# Patient Record
Sex: FEMALE | Race: WHITE | NOT HISPANIC OR LATINO | Employment: FULL TIME | URBAN - METROPOLITAN AREA
[De-identification: names, ages, dates, MRNs, and addresses within clinical notes are randomized per-mention and may not be internally consistent; named-entity substitution may affect disease eponyms.]

---

## 2023-05-29 ENCOUNTER — PATIENT MESSAGE (OUTPATIENT)
Dept: PRIMARY CARE CLINIC | Facility: CLINIC | Age: 33
End: 2023-05-29
Payer: COMMERCIAL

## 2023-05-30 ENCOUNTER — OFFICE VISIT (OUTPATIENT)
Dept: PRIMARY CARE CLINIC | Facility: CLINIC | Age: 33
End: 2023-05-30
Payer: COMMERCIAL

## 2023-05-30 ENCOUNTER — LAB VISIT (OUTPATIENT)
Dept: LAB | Facility: HOSPITAL | Age: 33
End: 2023-05-30
Attending: FAMILY MEDICINE
Payer: COMMERCIAL

## 2023-05-30 VITALS
HEIGHT: 64 IN | HEART RATE: 87 BPM | RESPIRATION RATE: 18 BRPM | DIASTOLIC BLOOD PRESSURE: 72 MMHG | SYSTOLIC BLOOD PRESSURE: 112 MMHG | BODY MASS INDEX: 22.77 KG/M2 | OXYGEN SATURATION: 97 % | WEIGHT: 133.38 LBS

## 2023-05-30 DIAGNOSIS — Z86.32 HISTORY OF GESTATIONAL DIABETES: ICD-10-CM

## 2023-05-30 DIAGNOSIS — E03.9 ACQUIRED HYPOTHYROIDISM: ICD-10-CM

## 2023-05-30 DIAGNOSIS — Z00.00 ROUTINE GENERAL MEDICAL EXAMINATION AT A HEALTH CARE FACILITY: ICD-10-CM

## 2023-05-30 DIAGNOSIS — Z00.00 ROUTINE GENERAL MEDICAL EXAMINATION AT A HEALTH CARE FACILITY: Primary | ICD-10-CM

## 2023-05-30 PROBLEM — R11.0 NAUSEA: Status: ACTIVE | Noted: 2022-06-01

## 2023-05-30 LAB
ALBUMIN SERPL BCP-MCNC: 4.2 G/DL (ref 3.5–5.2)
ALP SERPL-CCNC: 51 U/L (ref 55–135)
ALT SERPL W/O P-5'-P-CCNC: 25 U/L (ref 10–44)
ANION GAP SERPL CALC-SCNC: 6 MMOL/L (ref 8–16)
AST SERPL-CCNC: 20 U/L (ref 10–40)
BASOPHILS # BLD AUTO: 0.04 K/UL (ref 0–0.2)
BASOPHILS NFR BLD: 0.7 % (ref 0–1.9)
BILIRUB SERPL-MCNC: 0.4 MG/DL (ref 0.1–1)
BUN SERPL-MCNC: 9 MG/DL (ref 6–20)
CALCIUM SERPL-MCNC: 9.4 MG/DL (ref 8.7–10.5)
CHLORIDE SERPL-SCNC: 107 MMOL/L (ref 95–110)
CHOLEST SERPL-MCNC: 124 MG/DL (ref 120–199)
CHOLEST/HDLC SERPL: 3.1 {RATIO} (ref 2–5)
CO2 SERPL-SCNC: 29 MMOL/L (ref 23–29)
CREAT SERPL-MCNC: 0.7 MG/DL (ref 0.5–1.4)
DIFFERENTIAL METHOD: ABNORMAL
EOSINOPHIL # BLD AUTO: 0.1 K/UL (ref 0–0.5)
EOSINOPHIL NFR BLD: 1.1 % (ref 0–8)
ERYTHROCYTE [DISTWIDTH] IN BLOOD BY AUTOMATED COUNT: 13.1 % (ref 11.5–14.5)
EST. GFR  (NO RACE VARIABLE): >60 ML/MIN/1.73 M^2
ESTIMATED AVG GLUCOSE: 94 MG/DL (ref 68–131)
GLUCOSE SERPL-MCNC: 85 MG/DL (ref 70–110)
HBA1C MFR BLD: 4.9 % (ref 4–5.6)
HCT VFR BLD AUTO: 40.6 % (ref 37–48.5)
HDLC SERPL-MCNC: 40 MG/DL (ref 40–75)
HDLC SERPL: 32.3 % (ref 20–50)
HGB BLD-MCNC: 12.7 G/DL (ref 12–16)
IMM GRANULOCYTES # BLD AUTO: 0.01 K/UL (ref 0–0.04)
IMM GRANULOCYTES NFR BLD AUTO: 0.2 % (ref 0–0.5)
LDLC SERPL CALC-MCNC: 72.8 MG/DL (ref 63–159)
LYMPHOCYTES # BLD AUTO: 1.2 K/UL (ref 1–4.8)
LYMPHOCYTES NFR BLD: 20.5 % (ref 18–48)
MCH RBC QN AUTO: 30.3 PG (ref 27–31)
MCHC RBC AUTO-ENTMCNC: 31.3 G/DL (ref 32–36)
MCV RBC AUTO: 97 FL (ref 82–98)
MONOCYTES # BLD AUTO: 0.4 K/UL (ref 0.3–1)
MONOCYTES NFR BLD: 6.8 % (ref 4–15)
NEUTROPHILS # BLD AUTO: 4 K/UL (ref 1.8–7.7)
NEUTROPHILS NFR BLD: 70.7 % (ref 38–73)
NONHDLC SERPL-MCNC: 84 MG/DL
NRBC BLD-RTO: 0 /100 WBC
PLATELET # BLD AUTO: 228 K/UL (ref 150–450)
PMV BLD AUTO: 11.5 FL (ref 9.2–12.9)
POTASSIUM SERPL-SCNC: 4.4 MMOL/L (ref 3.5–5.1)
PROT SERPL-MCNC: 7.2 G/DL (ref 6–8.4)
RBC # BLD AUTO: 4.19 M/UL (ref 4–5.4)
SODIUM SERPL-SCNC: 142 MMOL/L (ref 136–145)
TRIGL SERPL-MCNC: 56 MG/DL (ref 30–150)
WBC # BLD AUTO: 5.6 K/UL (ref 3.9–12.7)

## 2023-05-30 PROCEDURE — 36415 COLL VENOUS BLD VENIPUNCTURE: CPT | Mod: PN | Performed by: FAMILY MEDICINE

## 2023-05-30 PROCEDURE — 3074F PR MOST RECENT SYSTOLIC BLOOD PRESSURE < 130 MM HG: ICD-10-PCS | Mod: CPTII,S$GLB,, | Performed by: FAMILY MEDICINE

## 2023-05-30 PROCEDURE — 1159F PR MEDICATION LIST DOCUMENTED IN MEDICAL RECORD: ICD-10-PCS | Mod: CPTII,S$GLB,, | Performed by: FAMILY MEDICINE

## 2023-05-30 PROCEDURE — 1159F MED LIST DOCD IN RCRD: CPT | Mod: CPTII,S$GLB,, | Performed by: FAMILY MEDICINE

## 2023-05-30 PROCEDURE — 3008F BODY MASS INDEX DOCD: CPT | Mod: CPTII,S$GLB,, | Performed by: FAMILY MEDICINE

## 2023-05-30 PROCEDURE — 84443 ASSAY THYROID STIM HORMONE: CPT | Performed by: FAMILY MEDICINE

## 2023-05-30 PROCEDURE — 3008F PR BODY MASS INDEX (BMI) DOCUMENTED: ICD-10-PCS | Mod: CPTII,S$GLB,, | Performed by: FAMILY MEDICINE

## 2023-05-30 PROCEDURE — 85025 COMPLETE CBC W/AUTO DIFF WBC: CPT | Performed by: FAMILY MEDICINE

## 2023-05-30 PROCEDURE — 1160F RVW MEDS BY RX/DR IN RCRD: CPT | Mod: CPTII,S$GLB,, | Performed by: FAMILY MEDICINE

## 2023-05-30 PROCEDURE — 3078F PR MOST RECENT DIASTOLIC BLOOD PRESSURE < 80 MM HG: ICD-10-PCS | Mod: CPTII,S$GLB,, | Performed by: FAMILY MEDICINE

## 2023-05-30 PROCEDURE — 3074F SYST BP LT 130 MM HG: CPT | Mod: CPTII,S$GLB,, | Performed by: FAMILY MEDICINE

## 2023-05-30 PROCEDURE — 99999 PR PBB SHADOW E&M-EST. PATIENT-LVL IV: CPT | Mod: PBBFAC,,, | Performed by: FAMILY MEDICINE

## 2023-05-30 PROCEDURE — 3078F DIAST BP <80 MM HG: CPT | Mod: CPTII,S$GLB,, | Performed by: FAMILY MEDICINE

## 2023-05-30 PROCEDURE — 99999 PR PBB SHADOW E&M-EST. PATIENT-LVL IV: ICD-10-PCS | Mod: PBBFAC,,, | Performed by: FAMILY MEDICINE

## 2023-05-30 PROCEDURE — 80053 COMPREHEN METABOLIC PANEL: CPT | Performed by: FAMILY MEDICINE

## 2023-05-30 PROCEDURE — 80061 LIPID PANEL: CPT | Performed by: FAMILY MEDICINE

## 2023-05-30 PROCEDURE — 83525 ASSAY OF INSULIN: CPT | Performed by: FAMILY MEDICINE

## 2023-05-30 PROCEDURE — 99385 PR PREVENTIVE VISIT,NEW,18-39: ICD-10-PCS | Mod: S$GLB,,, | Performed by: FAMILY MEDICINE

## 2023-05-30 PROCEDURE — 1160F PR REVIEW ALL MEDS BY PRESCRIBER/CLIN PHARMACIST DOCUMENTED: ICD-10-PCS | Mod: CPTII,S$GLB,, | Performed by: FAMILY MEDICINE

## 2023-05-30 PROCEDURE — 84439 ASSAY OF FREE THYROXINE: CPT | Performed by: FAMILY MEDICINE

## 2023-05-30 PROCEDURE — 99385 PREV VISIT NEW AGE 18-39: CPT | Mod: S$GLB,,, | Performed by: FAMILY MEDICINE

## 2023-05-30 PROCEDURE — 83036 HEMOGLOBIN GLYCOSYLATED A1C: CPT | Performed by: FAMILY MEDICINE

## 2023-05-30 RX ORDER — SERTRALINE HYDROCHLORIDE 50 MG/1
TABLET, FILM COATED ORAL
COMMUNITY
End: 2023-05-30 | Stop reason: ALTCHOICE

## 2023-05-30 RX ORDER — LEVOTHYROXINE SODIUM 50 UG/1
50 TABLET ORAL EVERY MORNING
Qty: 90 TABLET | Refills: 3 | Status: SHIPPED | OUTPATIENT
Start: 2023-05-30

## 2023-05-30 RX ORDER — METFORMIN HYDROCHLORIDE 500 MG/1
TABLET, EXTENDED RELEASE ORAL
Qty: 166 TABLET | Refills: 0 | Status: SHIPPED | OUTPATIENT
Start: 2023-05-30 | End: 2023-08-23 | Stop reason: SDUPTHER

## 2023-05-30 NOTE — PATIENT INSTRUCTIONS
The Insulin-Resistance Diet--Revised and Updated: How to Turn Off Your Body's Fat-Making Machine Paperback - December 10, 2007 -2020 updated  by Cheryle R. Hart (Author), Renea Oliveira (Author)

## 2023-05-30 NOTE — PROGRESS NOTES
Subjective:      Patient ID: Betty Westbrook is a 33 y.o. female.    Chief Complaint: Annual Exam and Establish Care      Patient is a 33 y.o. female coming in today to establish care. This is a new pt to me.   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 Reports she recently found out thyroid issues after recent lab work. Denies tobacco or drug use. She drinks alcohol twice a week. Currently sexually active with ; they have 2 kids. Pt has IUD in place. She had both births via . She is concerned for her weight gain for the past few years. States she has gained around 15-20 lbs in the last 3 years. Hx of gestational diabetes. Has not tried Metformin for blood glucose management in the past.     No questionnaires on file.  Past Medical History:   Diagnosis Date    Gestational diabetes     Hypothyroidism     Insulin resistance      Past Surgical History:   Procedure Laterality Date     SECTION, LOW TRANSVERSE  2019    x 2 and also in     WISDOM TOOTH EXTRACTION  2019     History reviewed. No pertinent family history.  Social History     Tobacco Use    Smoking status: Never    Smokeless tobacco: Never   Substance and Sexual Activity    Alcohol use: Yes     Alcohol/week: 2.0 standard drinks     Types: 2 Standard drinks or equivalent per week    Drug use: Never    Sexual activity: Yes     Partners: Male     Birth control/protection: I.U.D.       LABS:   No results found for: HGBA1C   No results found for: CHOL  No results found for: LDLCALC  No results found for: WBC, HGB, HCT, PLT, CHOL, TRIG, HDL, LDLDIRECT, ALT, AST, NA, K, CL, CREATININE, BUN, CO2, TSH, PSA, INR, GLUF, HGBA1C, MICROALBUR    No image results found.        Review of Systems   Constitutional:  Negative for chills, fatigue and fever.   HENT:  Negative for ear pain and trouble swallowing.    Eyes:  Negative for pain and visual disturbance.   Respiratory:  Negative for cough and shortness of breath.    Cardiovascular:   "Negative for chest pain and leg swelling.   Gastrointestinal:  Negative for abdominal pain, blood in stool, nausea and vomiting.   Endocrine: Negative for cold intolerance and heat intolerance.   Genitourinary:  Negative for dysuria and frequency.   Musculoskeletal:  Negative for joint swelling, myalgias and neck pain.   Skin:  Negative for color change and rash.   Neurological:  Negative for dizziness and headaches.   Psychiatric/Behavioral:  Negative for behavioral problems and sleep disturbance.    Objective:     Vitals:    05/30/23 0808   BP: 112/72   BP Location: Left arm   Patient Position: Sitting   BP Method: Medium (Manual)   Pulse: 87   Resp: 18   SpO2: 97%   Weight: 60.5 kg (133 lb 6.1 oz)   Height: 5' 4" (1.626 m)     Wt Readings from Last 10 Encounters:   05/30/23 60.5 kg (133 lb 6.1 oz)   02/08/23 55.3 kg (122 lb)     Physical Exam  Vitals reviewed.   Constitutional:       Appearance: Normal appearance. She is well-developed and normal weight.   HENT:      Head: Normocephalic and atraumatic.      Right Ear: Tympanic membrane and external ear normal.      Left Ear: Tympanic membrane and external ear normal.      Nose: Nose normal.      Mouth/Throat:      Mouth: Mucous membranes are moist.      Pharynx: Oropharynx is clear.   Eyes:      Conjunctiva/sclera: Conjunctivae normal.      Pupils: Pupils are equal, round, and reactive to light.   Neck:      Thyroid: No thyromegaly.   Cardiovascular:      Rate and Rhythm: Normal rate and regular rhythm.      Heart sounds: Normal heart sounds. No murmur heard.    No friction rub. No gallop.   Pulmonary:      Effort: Pulmonary effort is normal. No respiratory distress.      Breath sounds: Normal breath sounds. No wheezing or rales.   Abdominal:      General: Bowel sounds are normal. There is no distension.      Palpations: Abdomen is soft.      Tenderness: There is no abdominal tenderness. There is no rebound.   Musculoskeletal:         General: Normal range of " motion.      Cervical back: Normal range of motion and neck supple.   Lymphadenopathy:      Cervical: No cervical adenopathy.   Skin:     General: Skin is warm and dry.      Findings: No rash.   Neurological:      Mental Status: She is alert and oriented to person, place, and time.   Psychiatric:         Attention and Perception: Attention and perception normal.         Mood and Affect: Mood and affect normal.         Speech: Speech normal.         Behavior: Behavior normal.         Thought Content: Thought content normal.         Cognition and Memory: Cognition and memory normal.         Judgment: Judgment normal.     Assessment:     1. Routine general medical examination at a health care facility    2. Acquired hypothyroidism    3. History of gestational diabetes      Plan:   Betty was seen today for annual exam and establish care.    Diagnoses and all orders for this visit:    Routine general medical examination at a health care facility  -     TSH; Future  -     T4, Free; Future  -     Lipid Panel; Future  -     Hemoglobin A1C; Future  -     Comprehensive Metabolic Panel; Future  -     CBC Auto Differential; Future  -     Insulin, Random; Future    Acquired hypothyroidism  -     TSH; Future  -     T4, Free; Future  -     Lipid Panel; Future  -     Hemoglobin A1C; Future  -     Comprehensive Metabolic Panel; Future  -     CBC Auto Differential; Future  -     Insulin, Random; Future  -     Ambulatory referral/consult to [facility]; Future    History of gestational diabetes  -     TSH; Future  -     T4, Free; Future  -     Lipid Panel; Future  -     Hemoglobin A1C; Future  -     Comprehensive Metabolic Panel; Future  -     CBC Auto Differential; Future  -     Insulin, Random; Future  -     metFORMIN (GLUCOPHAGE-XR) 500 MG ER 24hr tablet; Take 1 tablet (500 mg total) by mouth once daily for 14 days, THEN 1 tablet (500 mg total) 2 (two) times daily with meals. For prediabetes/sugar.  -     Ambulatory  referral/consult to [facility]; Future    Other orders  -     levothyroxine (SYNTHROID) 50 MCG tablet; Take 1 tablet by mouth every morning.      Pt established care with me today.   Lab work ordered to be completed today.   New Rx Metformin 500 mg/day for 2 weeks for blood glucose management; pt to increase Metformin to 500 mg BID after 2 weeks.  Refilled Rx Levothyroxine 50 mcg/day for thyroid disorder treatment.   [facility], hx of gestational diabetes. Prior on ozempic through [place] study and did well.    Advised on dietary changes and regular exercise.   Instructed to f/u in 1 year.     Health Maintenance Due   Topic Date Due    Hepatitis C Screening  Never done    Cervical Cancer Screening  Never done    HIV Screening  Never done    COVID-19 Vaccine (4 - Pfizer series) 12/15/2021     The ASCVD Risk score (Darell DK, et al., 2019) failed to calculate for the following reasons:    The 2019 ASCVD risk score is only valid for ages 40 to 79  Follow up in about 1 year (around 5/30/2024) for physical with Dr MÉNDEZ.    Patient Instructions   The Insulin-Resistance Diet--Revised and Updated: How to Turn Off Your Body's Fat-Making Machine Paperback - December 10, 2007 -2020 updated  by Cheryle R. Hart (Author), Renea Oliveira (Author)      Scribe Attestation:   I, Reymundo Mccain, am scribing for, and in the presence of, Dr. Mari Méndez MD. I performed the above scribed service and the documentation accurately describes the services I performed. I attest to the accuracy of the note.    I, Dr. Mari Méndez MD, reviewed documentation as scribed above. I personally performed the services described in this documentation.  I agree that the record reflects my personal performance and is accurate and complete. Mari Méndez MD.    05/30/2023

## 2023-05-31 LAB
INSULIN COLLECTION INTERVAL: NORMAL
INSULIN SERPL-ACNC: 5.7 UU/ML
T4 FREE SERPL-MCNC: 0.95 NG/DL (ref 0.71–1.51)
TSH SERPL DL<=0.005 MIU/L-ACNC: 1.06 UIU/ML (ref 0.4–4)

## 2023-06-01 ENCOUNTER — OFFICE VISIT (OUTPATIENT)
Dept: PRIMARY CARE CLINIC | Facility: CLINIC | Age: 33
End: 2023-06-01
Payer: COMMERCIAL

## 2023-06-01 VITALS
TEMPERATURE: 99 F | BODY MASS INDEX: 22.74 KG/M2 | WEIGHT: 133.19 LBS | OXYGEN SATURATION: 99 % | HEIGHT: 64 IN | DIASTOLIC BLOOD PRESSURE: 70 MMHG | SYSTOLIC BLOOD PRESSURE: 110 MMHG | HEART RATE: 78 BPM

## 2023-06-01 DIAGNOSIS — E88.819 INSULIN RESISTANCE: Primary | ICD-10-CM

## 2023-06-01 DIAGNOSIS — Z83.3 FAMILY HISTORY OF DIABETES MELLITUS (DM): ICD-10-CM

## 2023-06-01 DIAGNOSIS — E03.9 ACQUIRED HYPOTHYROIDISM: ICD-10-CM

## 2023-06-01 DIAGNOSIS — Z86.32 HISTORY OF GESTATIONAL DIABETES: ICD-10-CM

## 2023-06-01 DIAGNOSIS — R63.5 WEIGHT GAIN: ICD-10-CM

## 2023-06-01 PROCEDURE — 99999 PR PBB SHADOW E&M-EST. PATIENT-LVL V: ICD-10-PCS | Mod: PBBFAC,,, | Performed by: FAMILY MEDICINE

## 2023-06-01 PROCEDURE — 1160F RVW MEDS BY RX/DR IN RCRD: CPT | Mod: CPTII,S$GLB,, | Performed by: FAMILY MEDICINE

## 2023-06-01 PROCEDURE — 99214 PR OFFICE/OUTPT VISIT, EST, LEVL IV, 30-39 MIN: ICD-10-PCS | Mod: S$GLB,,, | Performed by: FAMILY MEDICINE

## 2023-06-01 PROCEDURE — 3074F PR MOST RECENT SYSTOLIC BLOOD PRESSURE < 130 MM HG: ICD-10-PCS | Mod: CPTII,S$GLB,, | Performed by: FAMILY MEDICINE

## 2023-06-01 PROCEDURE — 99999 PR PBB SHADOW E&M-EST. PATIENT-LVL V: CPT | Mod: PBBFAC,,, | Performed by: FAMILY MEDICINE

## 2023-06-01 PROCEDURE — 1159F MED LIST DOCD IN RCRD: CPT | Mod: CPTII,S$GLB,, | Performed by: FAMILY MEDICINE

## 2023-06-01 PROCEDURE — 3078F PR MOST RECENT DIASTOLIC BLOOD PRESSURE < 80 MM HG: ICD-10-PCS | Mod: CPTII,S$GLB,, | Performed by: FAMILY MEDICINE

## 2023-06-01 PROCEDURE — 3008F BODY MASS INDEX DOCD: CPT | Mod: CPTII,S$GLB,, | Performed by: FAMILY MEDICINE

## 2023-06-01 PROCEDURE — 3078F DIAST BP <80 MM HG: CPT | Mod: CPTII,S$GLB,, | Performed by: FAMILY MEDICINE

## 2023-06-01 PROCEDURE — 3044F PR MOST RECENT HEMOGLOBIN A1C LEVEL <7.0%: ICD-10-PCS | Mod: CPTII,S$GLB,, | Performed by: FAMILY MEDICINE

## 2023-06-01 PROCEDURE — 1159F PR MEDICATION LIST DOCUMENTED IN MEDICAL RECORD: ICD-10-PCS | Mod: CPTII,S$GLB,, | Performed by: FAMILY MEDICINE

## 2023-06-01 PROCEDURE — 3074F SYST BP LT 130 MM HG: CPT | Mod: CPTII,S$GLB,, | Performed by: FAMILY MEDICINE

## 2023-06-01 PROCEDURE — 3044F HG A1C LEVEL LT 7.0%: CPT | Mod: CPTII,S$GLB,, | Performed by: FAMILY MEDICINE

## 2023-06-01 PROCEDURE — 3008F PR BODY MASS INDEX (BMI) DOCUMENTED: ICD-10-PCS | Mod: CPTII,S$GLB,, | Performed by: FAMILY MEDICINE

## 2023-06-01 PROCEDURE — 1160F PR REVIEW ALL MEDS BY PRESCRIBER/CLIN PHARMACIST DOCUMENTED: ICD-10-PCS | Mod: CPTII,S$GLB,, | Performed by: FAMILY MEDICINE

## 2023-06-01 PROCEDURE — 99214 OFFICE O/P EST MOD 30 MIN: CPT | Mod: S$GLB,,, | Performed by: FAMILY MEDICINE

## 2023-06-01 NOTE — PROGRESS NOTES
Betty,     Your lab results are within recommended goals for your age and conditions. No change in therapy is needed. Please let me know if you have further questions.    Sincerely,   Mari Méndez MD

## 2023-06-01 NOTE — PROGRESS NOTES
Subjective     Pmhx, fam hx, soc hx, surg hx, allergies, med list reviewed  Referring MD: Honey  PCP: same  BMI noted 22  Diet: variable  Exercise/Activity: as below  Sleep: fair  Stressors: work/peds OT  Anxiety/Depression Screen/PHQ-2: stable     Wt Readings from Last 3 Encounters:   06/01/23 1005 60.4 kg (133 lb 2.5 oz)   05/30/23 0808 60.5 kg (133 lb 6.1 oz)   02/08/23 0751 55.3 kg (122 lb)     Has IUD; has had weight gain.       Patient ID: Betty Westbrook is a 33 y.o. female.    Chief Complaint: weight gain     Strong fam hx of DM II. Had gestational DM II. Weight went from 163 to 115 while on study--was on for 8  mos. Study completed and was off the medication. She is working out 3-4 times/week and has seen weight increase. More cravings. She felt like was doing very well. ([facility] study).     Feels like max lowest was in 150s. Prior to children was in 130s.     Thus far tolerating metformin. DW pt Pt had IR on panel at [facility].     Hx of BMR done in the past; does not know what it was.       Food recall:  Bfast: coffee with cream/sugar  Eats 11:30  Add protein to coffee (collagen)  Lunch: turkey pesto/spinach turkey wrap Taco salad from downstairs  Spinach red onion, cranberries, rotisserie chicken  Iced coffee at times  Dinner:  cooks/works from home  Red beans/sausage, tacos, CFA once/week, 2 year old/4 year old  Snacks:   Water: adequate  Sugar Sweetened beverages: coffee    HPI  Review of Systems   Constitutional:  Negative for activity change, appetite change, fatigue and fever.   HENT:  Negative for mouth dryness and goiter.    Eyes:  Negative for visual disturbance.   Respiratory:  Negative for apnea, cough, chest tightness and shortness of breath.    Cardiovascular:  Negative for chest pain, palpitations and leg swelling.   Gastrointestinal:  Negative for abdominal pain, constipation and diarrhea.   Endocrine: Negative for cold intolerance, heat intolerance, polydipsia, polyphagia and polyuria.    Genitourinary:  Negative for frequency and menstrual problem.   Musculoskeletal:  Negative for arthralgias and myalgias.   Integumentary:  Negative for color change and rash.   Psychiatric/Behavioral:  Negative for sleep disturbance. The patient is not nervous/anxious.         Objective     Physical Exam  Vitals and nursing note reviewed.   Constitutional:       General: She is not in acute distress.  HENT:      Head: Normocephalic and atraumatic.      Mouth/Throat:      Pharynx: Oropharynx is clear.   Eyes:      General: No scleral icterus.  Neck:      Comments: No TM  Cardiovascular:      Rate and Rhythm: Normal rate and regular rhythm.      Pulses: Normal pulses.      Heart sounds: Normal heart sounds. No murmur heard.    No friction rub. No gallop.   Pulmonary:      Effort: Pulmonary effort is normal. No respiratory distress.      Breath sounds: Normal breath sounds. No wheezing, rhonchi or rales.   Abdominal:      General: Bowel sounds are normal. There is no distension.      Palpations: Abdomen is soft.      Tenderness: There is no abdominal tenderness.   Musculoskeletal:         General: No swelling.      Cervical back: Normal range of motion and neck supple. No tenderness.   Lymphadenopathy:      Cervical: No cervical adenopathy.   Skin:     General: Skin is warm.      Findings: No erythema or rash.   Neurological:      Mental Status: She is alert and oriented to person, place, and time.   Psychiatric:         Mood and Affect: Mood normal.         Behavior: Behavior normal.        Assessment and Plan       ICD-10-CM ICD-9-CM   1. Insulin resistance  E88.81 277.7   2. Weight gain  R63.5 783.1   3. History of gestational diabetes  Z86.32 V12.21   4. Acquired hypothyroidism  E03.9 244.9   5. BMI 22.0-22.9, adult  Z68.22 V85.1   6. Family history of diabetes mellitus (DM)  Z83.3 V18.0      Insulin resistance  Comments:  continue metformin as toleratd  dw pt consideration of glumetza  Did well on glp-1 in the  past but not an option now      Weight gain    History of gestational diabetes  Comments:  reviewed with pt her IR panel ([facility])  some hypoglycemia/consider CGM briefly  Orders:  -     Ambulatory referral/consult to [facility]    Acquired hypothyroidism  Comments:  chronic/stable  continue medication  Orders:  -     Ambulatory referral/consult to [facility]    BMI 22.0-22.9, adult    Family history of diabetes mellitus (DM)      35+ min with pt;      Did well on medication in the past.   20 min 2x/week resistance training (ok to start with this)  Stay on metformin   Specific ideas for protein pairing for snacks dw pt   Eat fit shopping list  Referral dietician when actively seeing patients)  Placed in system

## 2023-06-01 NOTE — PATIENT INSTRUCTIONS
"Nutrional Yeast ([place]): Red Star (baking aisle)  Low carb wraps with protein    Dark chocolate: Lilys chocolate, cecil Zero  Popcorn: Lesser Evil   Chips: Beanitos    Greek yogurt for dips    WM: Polo Hurley bread for pizza            PRODUCE  [] All fresh fruit   [] All fresh vegetables   [] All fresh herbs  [] All herb purees + pastes  [] Pre-spiralized vegetable noodles   [] Steam-In-The-Bag begetables  [] Riced cauliflower  [] Jicama sticks  [] Love Beets  all varieties  [] Wholly Guacamole  all varieties  [] Hummus  all varieties, chickpea + vegetable  [] Tofu Shirataki noodles    [] Tofu  all varieties  [] Tempeh  all varieties    PROTEIN  CHICKEN   [] Boneless, skinless breasts  [] Boneless, skinless thighs  [] Ground chicken breast, at least 93% lean  [] Chicken breast cutlet  [] Aidell's  Chicken Sausage + Chicken Meatballs    TURKEY   [] Turkey breast tenderloin   [] Ground turkey breast, at least 93% lean  [] [place] Naturals  Turkey Sausage    BEEF  [] Tenderloin  [] Sirloin  [] Top Loin  [] Flank Steak  [] Round Steak  [] Filet  [] Lean ground beef, at least 93% lean + grass-fed preferable    PORK  [] Tenderloin  [] Pork Chop  [] Center Cut  [] Jason Naturals  No-Sugar Rust    BISON  [] [place]  90 - 95% lean    SEAFOOD  [] All fresh fish + seafood; locally sourced when possible  [] Smoked salmon    HEAT + EAT ENTREES   [] Bala's Natural Foods  Chicken, Pork, Beef  [] Wenceslao  "All Natural" Grilled Chicken Breast + Strips, all varieties    SAUCES SPREADS + DIPS  [] Bitchin Sauce  Original, Chipotle, Cilantro [place]  [] Radha's Kitchen  Tzatziki Yogurt Dip, Babaganoush, Hummus  [] Wholly Guacamole  all varieties  [] Hummus  all varieties  [] [place] Gringo Salsa  all varieties  [] Mrs. Hilario's Salsa  all varieties  [] Stubb's All Natural BBQ Sauce  [] Primal Kitchen  Anabella Vasquez, BBQ Sauce  [] Primal Kitchen Pasta Sauce  Roasted Garlic, Tomato Basil, no-dairy Vodka " Sauce  [] Regis & Bella's  HeartSmart Pasta Sauce    DAIRY/DAIRY SUBSTITUTES/EGGS  EGGS   [] All eggs  cage-free, pasture-raise preferable  [] Crepini  egg wraps  [] Vital Farms  Pasture-Raised Egg Bites  [] JUST Egg [vegan]     CREAMERS   [] Califia  Better Half, original + vanilla unsweetened  [] NutPods  all varieties    MILK   [] Horizon Organic  all varieties except chocolate  [] Organic Valley  all varieties except chocolate  [] Organic Valley  ultra-filtered, reduced fat milk     PLANT_BASED MILK ALTERNATIVES  [] All unsweetened almond milks  original, vanilla + chocolate  [] Ripple  unsweetened   [] Milkadamia  original +_ vanilla, unsweetened   [] Forager  original + vanilla, unsweetened   [] Silk Organic  soy milk, unsweetened  [] Oatly  unsweetened  [] Califia  regular + protein-fortified oat milk, unsweetened     CHEESES  [] Regular or reduced fat cheeses  [] BelGioso  Fresh Mozzarella Snack Packs, Parmesan Power-full Snack   [] Goat cheese  [] Fresh mozzarella  [] String cheese  all varieties  [] Harika Cottage Cheese  [] Caitlin's Cultured Cottage Cheese  [] Cat Life 'Just Like Mozzarella'  plant-based shreds and other varieties  [] Parmela Creamery  plant-based shredded cheese    YOGURT  [] Fage  2% low-fat, plain  [] Siggi's  plain, vanilla  [] Chobani Greek  nonfat + whole milk yogurt, plain   [] Chobani Less Sugar  all flavored varieties   [] Oikos Greek  nonfat, plain  [] Two Good  all varieties   [] Kinyarwanda Provisions  plain  [] [place] Organic  low-fat + nonfat, plain  [] [facility]  goat milk yogurt, plain  [] Kefit  unsweetened, plain  [] Forager  Greek style unsweetened, plain [dairy-free]  [] [place]  unsweetened Greek style, plain [dairy-free]  [] [facility]  almond milk yogurt, vanilla or plain, unsweetened [dairy-free]    FREEZER SECTION  FROZEN VEGGIES  [] All plain frozen veggies + greens [e.g. broccoli, brussels, carrots, okra, mushrooms, zucchini,  yellow squash, butternut squash, kale, spinach, atif greens]  [] Riced veggies [e.g. cauliflower, broccoli, butternut squash]  [] Edamame  all varieties  [] Green Giant  [] Veggie Spirals  [] Marinated Veggies [e.g. eggplant, peppers, zucchini]  [] Simply Steam [place] Sprouts  [] Birds Eye  [] Power Blend Italian Style  lentils, broccoli, kale, zucchini  []  [place] Sprouts & Carrots  [] Oven Roasters Broccoli & Cauliflower  [] [place] Blend  [] Tattooed   [] Green Bean Blend  [] Farmer's Market Ratatouille  [] Butter Balsamic Glazed Vegetables  [] Riced Cauliflower & Quinoa Mediterranean Style  [] Sofia's Good Life  Southern Style Greens [sauteed kale + onion]    FROZEN FRUITS  [] All unsweetened frozen fruits  all varieties  [] Dole Fruits & Veggie Blends  Berries 'n Kale  [] Dole Mix-ins  Triple Berry     FROZEN ENTREES  [] The Good Kitchen meals  all varieties [ e.g. Chili Lime Chicken Over Riced Cauliflower]  [] Premium Paleo  Not Sergio Momma's Meatloaf  [] Primal Kitchen  Chicken Pesto + Steak Fajitas w/ Peppers & Onions  [] Eating Well Frozen Entrees  Butter Chicken Masala, Steak Carne Asada, Creamy Pesto Chicken, Chicken + Wild Rice Stroganoff, Yellow Chi Chicken, Sun-dried Tomato Chicken, Chicken Lo Mein  [] Realgood Entree Bowls  Barbadian Inspired Beef Bowl over Riced Cauliflower, Chicken Burrito Bowl   [] Great Karma Coconut Chi  [] Patrizia's  [place] Kathe with Black Beans, Vegetable Lasagna  [] Kashi Mayan [place] Bake  [] Healthy Choice  Simply Steamers Chicken Fried Rice  [] Basil Pesto Chicken & Alberto Style Pork Power Bowls  [] Tattooed   Enchilada Bowl  [] Aliya Farms  Spicy Black Bean Burgers    FROZEN PIZZAS  [] Cauli'flour Foods  Cauliflower Pizza Crusts  [] Outer Aisle  Cauliflower Crust  [] Patrizia's  Veggie Crust Cheese Pizza  [] Quest Pizza     VEGETARIAN PRODUCTS  [] Beyond Meat  ground 'meat' + grilled 'chicken' strips  [] Tofurkey  Original  Italian Sausage + Original Tempeh  [] Gardein  Beefless Ground + Meatless Meatballs  [] Aliya Farms Grillers  Original Burger, Crumbles, Meatballs    ICE CREAMS + FROZEN DESSERTS  [] Halo Top  regular + keto series, pops  [] Rebel  ice cream + dessert sandwiches  [] Enlightened  ice cream + bars  [] Nightfood  ice cream  [] Realgood  ice cream  [] Arctic Zero Fit  frozen pint  [] The Frozen Farmer  sorbets  [] Wholly Rollies  Protein Balls, all varieties  [] Dream Pops  Coconut Latte    FROZEN BREAKFASTS  [] Realgood  Breakfast Sandwiches on Cauliflower Cheesy Bread  [] Rebel  ice cream + dessert sandwiches  [] Enlightened  ice cream + bars  [] Nightfood  ice cream  [] Realgood  ice cream  [] Arctic Zero Fit  frozen pint  [] The Frozen Farmer  sorbets  [] Wholly Rollies  Protein Balls, all varieties  [] Dream Pops  Coconut Latte    BREADS/BUNS/WRAPS  [] Emiliano Bread: All Types - In Freezer Section   [] Flat Out Light Wraps - All Varieties   [] Flat Out Protein Up Carb Down Flat Bread   [] Kontos Whole Wheat Pocket Mei   [] Aamir RAMON 100% Whole Wheat Tortillas   [] LaTortilla Factory Tortillas - Smart & Delicious; 50 or 80-calorie   [] Nature's Own 100% Whole Wheat Bread   [] Orowheat Healthful - 100% Whole Wheat Slice Bread and Noble Thins   [] Orowheat Healthful - Whole Wheat Nuts & Grain Bread; Flax & Seed Bread   [] Pepperidge Farm Natural Whole Grain 15 Bread   [] Pepperidge Farm Natural Whole Grain English Muffin - 100% Whole Wheat   [] Pepperidge Farm Very Thin 100% Whole Wheat   [] Valentine Alejandro 45 Calories and Delightful   [] Jasiel' 100% Whole Wheat Thin-Sliced Bagels and English Muffins   [] Western Bagel: Perfect 10     GLUTEN FREE  [] Vaibhav's Gluten Free Bread   [] [place] Bakehouse 7-Grain Gluten Free Bread     LEGUME PASTA   [] Explore Asian Organic Black Bean Spaghetti   [] Modern Table   [] Tolerant Foods       NUT BUTTERS & JELLIES    NUT BUTTERS   [] Better'n Chocolate:  Coconut Chocolate Peanut Butter Spread   [] Better'n Peanut Butter - All Types   [] Earth Balance Coconut and Peanut Spread   [] Miles's Nut [place]   [] MaraNatha: All Natural Roasted Cashew Butter - Velpen or Creamy   [] MaraNatha: Roasted Peanut Butter   [] Nuts 'N More Peanut [place] - All Flavors   [] PB2 Powder - Original or Chocolate   [] Skippy Natural - Creamy, Super Chunk   [] Smart Balance Peanut Butter - Velpen or Creamy   [] Peanut Butter & Company:   [] Smooth , Crunch Time, The Heat Is On, Old Fashioned Smooth, Mighty Nut- Powdered Peanut Butter, Squeeze Pack   [] Smucker's Natural Peanut Butter - Velpen or Creamy   [] Sunbutter Nut Butter   [] Wild Friends Protein Peanut Butter/[place] o Butter - Vanilla or Chocolate     JELLIES  o Polaner's All Fruit   o Clearly Organic Best Choice: Strawberry Fruit Spread       SNACKS    BARS  [] Kashi Bars - Chewy or Crunchy; Honey [place] o Flax or Peanut Butter   [] KIND Bars - 5 Grams of Sugar or Less   [] KIND Protein Bars - Strong and KIND   [] Nature Valley Protein Bar - All Varieties   [] Nature Valley Roasted Nut Crunch - [place] Crunch; Peanut Crunch   [] [facility] Simple Nut Bar - Roasted Peanut & Honey   [] [facility] Simple Nut Bar - [place], Cashew & Sea Salt   [] [facility] Nut [place] Bar - Salted Caramel Peanut   [] Think Thin Protein Bars   [] Quest Bars, Power Crunch Bars, Pure Protein Bars     BEEF JERKY - NITRATE FREE   [] Game On   [] Grass Run Farms   [] Krave   [] Ostrim   [] Perky Jerky   [] Primal Strips Meatless Vegan Jerky   [] [place]     CHIPS   [] Beanitos Chips   [] Fruit Crisps - e.g. Brother's-All-Natural, Bare Fruit, Yoga Chips   [] Hannah's Soy Crisps: 1.3 ounce bag   [] Quest Protein Chips   [] Wasa Whole Wheat Crisp Bread     CRACKERS  [] Renea's Gone Crackers   [] Nabisco Triscuit: Regular and Thin Crisp Crackers   [] Vans Say Cheese Crackers (G-F)     POPCORN/NUTS   [] Eugenio Blackburn's Smart Pop Popcorn -  Single Serving   [] 100-Calorie Pack of Nuts - All Varieties     PROTEIN POWDERS & DRINKS  []  Protein -  Whey Protein Powder   [] Garden of Life Raw Protein Powder   [] Iconic Ready-To-Drink Protein Drink   [] Harris One Protein Powder   [] VegaSport Protein Powder     SALSA/HUMMUS/DIPS   [] Eat Well Embrace Life: Zesty Sriracha Carrot o Hummus   [] Pre-Portioned Guacamole Packs   [] Devin's   [] Tostitos Restaurant Style Salsa       SOUPS   [] Patrizia's Organic Soups - Lentil, Vegetable, Split Pea, Low-Sodium     CANNED GOODS   [] 100% Pure Pumpkin   [] BlueRunner Creole Cream-Style Red Beans or Navy Beans   [] Cajun Power Chicken Gumbo Base   [] Chicken of the Sea Orr [place]   [] Dayna Fresh Cut Sliced Beets   [] Hormel Breast of Chicken in Water   [] LeSuer Tender Baby Whole Carrots   [] Catarino Tabasco [place] Starter   [] Allenist: Chunk Lite Tuna in Water, Gourmet Select Pouches   [] StarKist: [place] Tuna Fillets   [] Trappey's: Kidney, Butter, Cheema, Black Eye, Field, and Black Beans   [] CELSO Adams's Turnip Greens or Ascencion Spinach     CONDIMENTS/ SAUCES/SPREADS/ SPICES  [] Neto Avila's Magic Seasonings - Regular or Salt Free   [] Sarah Campbell's Sauces - All Flavors   [] Laughing Cow Light - All Flavors   [] Dash Salt-Free Marinade - All Flavors   [] Regis & Bella's: Heart Smart Pasta Sauces   [] Tabasco     SALAD DRESSINGS  [] Melissa's Naturals: Lite Honey Mustard   [] Aidan's Own: Lighten Up Salad Dressing - All Varieties   [] OPA Greek Yogurt Dressings - Ranch, Blue o Cheese, Caesar, Feta Dill     SWEETENERS  [] Sweet M[place]et Sweetener   [] Swerve   [] Truvia     BEVERAGES  [] Coconut Water   [] Crystal Light PURE - All Flavors   [] Honest Tea: Just Green Tea, Unsweetened   [] Kombucha Tea   [] [place]   [] [place] Sisters Bloody Renea Mix   [] Metromint - Zero-Sugar; All Natural Flavored   [] Latonya - Plain or Flavored   [] Linda Bowers   [] Steaz - Zero-Sugar, All-Natural,  Sparkling Tea   [] Tea Bags: Any Brand - e.g. Jose, Yogi, Tazzo, Celestial   [] V8 100% Vegetable Juice   [] Vitamin Water Zero   [] Water   [] Zevia - Stevia Sweetened Soft Drink     BEER/SAGE/LIQUORS  []Villagran's Premier Light 64 Calories   [] Bud Select - 55 Calories   [] [facility]Nemours Foundation Sisters Bloody Renea Mix   [] Menon Genuine Draft - 64 Calories   [] Red or White Wine - All Varieties     CEREALS: HOT/COLD   [] Janell's Iwonaffin's Original Cereal  [] Angela's Mill Oat Bran Hot Cereal - Cracked Wheat, Multi-Grain  [] Kashi GoLean Cereal  [] Kashi GoLean Hot Cereal packets - Vanilla; Honey Cinnamon  [] Zak's Special K Protein Cereal  [] Alok's Steel Cut B[place]udian Oatmeal  [] Nature's Path Smart Bran  [] Gnosticist Instant Oatmeal packet, Original  [] Gnosticist Old Fashioned Gnosticist Oats  [] Uncle Quintin's Whole Wheat & Flaxseed Original Cereal

## 2023-08-07 ENCOUNTER — TELEPHONE (OUTPATIENT)
Dept: PRIMARY CARE CLINIC | Facility: CLINIC | Age: 33
End: 2023-08-07
Payer: COMMERCIAL

## 2023-08-07 NOTE — TELEPHONE ENCOUNTER
Spoke with patient and assisted her in rescheduling her appointment with Dr. Gaines to 8/14/23. She verbalized understanding.

## 2023-08-07 NOTE — TELEPHONE ENCOUNTER
----- Message from Lu Cordova sent at 8/7/2023 10:44 AM CDT -----  Patient called in this morning to reschedule her appointment for 08/10. Please call back [phone]. Thanks tpw

## 2023-08-23 DIAGNOSIS — Z86.32 HISTORY OF GESTATIONAL DIABETES: ICD-10-CM

## 2023-08-23 NOTE — TELEPHONE ENCOUNTER
No care due was identified.  [facility] Embedded Care Due Messages. Reference number: [id number].   8/23/2023 10:24:08 AM CDT

## 2023-08-28 RX ORDER — METFORMIN HYDROCHLORIDE 500 MG/1
500 TABLET, EXTENDED RELEASE ORAL 2 TIMES DAILY WITH MEALS
Qty: 180 TABLET | Refills: 1 | Status: SHIPPED | OUTPATIENT
Start: 2023-08-28

## 2023-10-04 ENCOUNTER — OFFICE VISIT (OUTPATIENT)
Dept: OBSTETRICS AND GYNECOLOGY | Facility: CLINIC | Age: 33
End: 2023-10-04
Payer: COMMERCIAL

## 2023-10-04 VITALS
DIASTOLIC BLOOD PRESSURE: 68 MMHG | HEIGHT: 64 IN | BODY MASS INDEX: 24.35 KG/M2 | SYSTOLIC BLOOD PRESSURE: 112 MMHG | WEIGHT: 142.63 LBS

## 2023-10-04 DIAGNOSIS — Z12.4 CERVICAL CANCER SCREENING: ICD-10-CM

## 2023-10-04 DIAGNOSIS — Z01.419 WELL WOMAN EXAM WITH ROUTINE GYNECOLOGICAL EXAM: Primary | ICD-10-CM

## 2023-10-04 PROCEDURE — 3008F BODY MASS INDEX DOCD: CPT | Mod: CPTII,S$GLB,, | Performed by: OBSTETRICS & GYNECOLOGY

## 2023-10-04 PROCEDURE — 99999 PR PBB SHADOW E&M-EST. PATIENT-LVL III: ICD-10-PCS | Mod: PBBFAC,,, | Performed by: OBSTETRICS & GYNECOLOGY

## 2023-10-04 PROCEDURE — 1160F RVW MEDS BY RX/DR IN RCRD: CPT | Mod: CPTII,S$GLB,, | Performed by: OBSTETRICS & GYNECOLOGY

## 2023-10-04 PROCEDURE — 3074F PR MOST RECENT SYSTOLIC BLOOD PRESSURE < 130 MM HG: ICD-10-PCS | Mod: CPTII,S$GLB,, | Performed by: OBSTETRICS & GYNECOLOGY

## 2023-10-04 PROCEDURE — 99385 PR PREVENTIVE VISIT,NEW,18-39: ICD-10-PCS | Mod: S$GLB,,, | Performed by: OBSTETRICS & GYNECOLOGY

## 2023-10-04 PROCEDURE — 3078F PR MOST RECENT DIASTOLIC BLOOD PRESSURE < 80 MM HG: ICD-10-PCS | Mod: CPTII,S$GLB,, | Performed by: OBSTETRICS & GYNECOLOGY

## 2023-10-04 PROCEDURE — 3044F PR MOST RECENT HEMOGLOBIN A1C LEVEL <7.0%: ICD-10-PCS | Mod: CPTII,S$GLB,, | Performed by: OBSTETRICS & GYNECOLOGY

## 2023-10-04 PROCEDURE — 99385 PREV VISIT NEW AGE 18-39: CPT | Mod: S$GLB,,, | Performed by: OBSTETRICS & GYNECOLOGY

## 2023-10-04 PROCEDURE — 3078F DIAST BP <80 MM HG: CPT | Mod: CPTII,S$GLB,, | Performed by: OBSTETRICS & GYNECOLOGY

## 2023-10-04 PROCEDURE — 88175 CYTOPATH C/V AUTO FLUID REDO: CPT | Performed by: OBSTETRICS & GYNECOLOGY

## 2023-10-04 PROCEDURE — 1159F PR MEDICATION LIST DOCUMENTED IN MEDICAL RECORD: ICD-10-PCS | Mod: CPTII,S$GLB,, | Performed by: OBSTETRICS & GYNECOLOGY

## 2023-10-04 PROCEDURE — 3044F HG A1C LEVEL LT 7.0%: CPT | Mod: CPTII,S$GLB,, | Performed by: OBSTETRICS & GYNECOLOGY

## 2023-10-04 PROCEDURE — 1160F PR REVIEW ALL MEDS BY PRESCRIBER/CLIN PHARMACIST DOCUMENTED: ICD-10-PCS | Mod: CPTII,S$GLB,, | Performed by: OBSTETRICS & GYNECOLOGY

## 2023-10-04 PROCEDURE — 87624 HPV HI-RISK TYP POOLED RSLT: CPT | Performed by: OBSTETRICS & GYNECOLOGY

## 2023-10-04 PROCEDURE — 3008F PR BODY MASS INDEX (BMI) DOCUMENTED: ICD-10-PCS | Mod: CPTII,S$GLB,, | Performed by: OBSTETRICS & GYNECOLOGY

## 2023-10-04 PROCEDURE — 99999 PR PBB SHADOW E&M-EST. PATIENT-LVL III: CPT | Mod: PBBFAC,,, | Performed by: OBSTETRICS & GYNECOLOGY

## 2023-10-04 PROCEDURE — 3074F SYST BP LT 130 MM HG: CPT | Mod: CPTII,S$GLB,, | Performed by: OBSTETRICS & GYNECOLOGY

## 2023-10-04 PROCEDURE — 1159F MED LIST DOCD IN RCRD: CPT | Mod: CPTII,S$GLB,, | Performed by: OBSTETRICS & GYNECOLOGY

## 2023-10-04 NOTE — PROGRESS NOTES
Subjective:      Patient ID: Betty Westbrook is a 33 y.o. female.    Chief Complaint:  Annual Exam      History of Present Illness  HPI  Presents for well-woman exam.  Pt has Mirena IUD in place since 3/2022.  Has had some intermittent spotting, but that has resolved.  MM with her .  He is possibly planning vasectomy.    Last pap: 1 year ago at [facility]    GYN & OB History  No LMP recorded. (Menstrual status: Birth Control).   Date of Last Pap: No result found    OB History    Para Term  AB Living   2 2 2     2   SAB IAB Ectopic Multiple Live Births           2      # Outcome Date GA Lbr Juancarlos/2nd Weight Sex Delivery Anes PTL Lv   2 Term     M    LUCY   1 Term     M    LCUY       Review of Systems  Review of Systems       Objective:     Physical Exam:   Constitutional: She is oriented to person, place, and time. She appears well-developed and well-nourished. No distress.      Neck: No thyromegaly present.     Pulmonary/Chest: Right breast exhibits no inverted nipple, no mass, no nipple discharge, no skin change, no tenderness, no bleeding and no swelling. Left breast exhibits no inverted nipple, no mass, no nipple discharge, no skin change, no tenderness, no bleeding and no swelling. Breasts are symmetrical.        Abdominal: Soft. She exhibits no distension and no mass. There is no abdominal tenderness. There is no rebound and no guarding.     Genitourinary:    Pelvic exam was performed with patient supine.   There is no rash, tenderness, lesion or injury on the right labia. There is no rash, tenderness, lesion or injury on the left labia. Cervix is normal. Right adnexum displays no mass, no tenderness and no fullness. Left adnexum displays no mass, no tenderness and no fullness. No erythema,  no vaginal discharge, tenderness, bleeding, rectocele or cystocele in the vagina.    No foreign body in the vagina.      No signs of injury in the vagina.   Cervix exhibits no motion tenderness, no discharge  and no friability.    pap smear completedUterus is not deviated, not enlarged, not fixed, not tender and not hosting fibroids. IUD strings visualized.              Neurological: She is alert and oriented to person, place, and time.     Psychiatric: She has a normal mood and affect.       Assessment:     1. Well woman exam with routine gynecological exam    2. Cervical cancer screening               Plan:     Betty was seen today for annual exam.    Diagnoses and all orders for this visit:    Well woman exam with routine gynecological exam    Cervical cancer screening  -     Liquid-Based Pap Smear, Screening  -     HPV High Risk Genotypes, PCR     Reviewed updated recommendations for pap smears (every 3 years) in low risk patients.   Recommend annual pelvic exams.  Reviewed recommendations for annual CBE.  Reviewed that some intermittent spotting/light periods can be expected with the Mirena.  If it is prolonged, heavy, painful or associated with discharge or odor, will need further eval.  RTC 1 year.

## 2023-10-10 LAB
FINAL PATHOLOGIC DIAGNOSIS: NORMAL
HPV HR 12 DNA SPEC QL NAA+PROBE: NEGATIVE
HPV16 AG SPEC QL: NEGATIVE
HPV18 DNA SPEC QL NAA+PROBE: NEGATIVE
Lab: NORMAL

## 2023-10-11 ENCOUNTER — OFFICE VISIT (OUTPATIENT)
Dept: PRIMARY CARE CLINIC | Facility: CLINIC | Age: 33
End: 2023-10-11
Payer: COMMERCIAL

## 2023-10-11 VITALS
WEIGHT: 140 LBS | SYSTOLIC BLOOD PRESSURE: 110 MMHG | RESPIRATION RATE: 18 BRPM | TEMPERATURE: 98 F | DIASTOLIC BLOOD PRESSURE: 70 MMHG | OXYGEN SATURATION: 99 % | HEART RATE: 69 BPM | BODY MASS INDEX: 23.9 KG/M2 | HEIGHT: 64 IN

## 2023-10-11 DIAGNOSIS — Z91.89 RISK OF BECOMING OVERWEIGHT: ICD-10-CM

## 2023-10-11 DIAGNOSIS — E03.9 ACQUIRED HYPOTHYROIDISM: ICD-10-CM

## 2023-10-11 DIAGNOSIS — Z86.32 HISTORY OF GESTATIONAL DIABETES: ICD-10-CM

## 2023-10-11 DIAGNOSIS — Z83.3 FAMILY HISTORY OF DIABETES MELLITUS (DM): ICD-10-CM

## 2023-10-11 DIAGNOSIS — E88.819 INSULIN RESISTANCE: ICD-10-CM

## 2023-10-11 PROCEDURE — 3078F PR MOST RECENT DIASTOLIC BLOOD PRESSURE < 80 MM HG: ICD-10-PCS | Mod: CPTII,S$GLB,, | Performed by: FAMILY MEDICINE

## 2023-10-11 PROCEDURE — 3008F BODY MASS INDEX DOCD: CPT | Mod: CPTII,S$GLB,, | Performed by: FAMILY MEDICINE

## 2023-10-11 PROCEDURE — 3008F PR BODY MASS INDEX (BMI) DOCUMENTED: ICD-10-PCS | Mod: CPTII,S$GLB,, | Performed by: FAMILY MEDICINE

## 2023-10-11 PROCEDURE — 99214 OFFICE O/P EST MOD 30 MIN: CPT | Mod: S$GLB,,, | Performed by: FAMILY MEDICINE

## 2023-10-11 PROCEDURE — 99999 PR PBB SHADOW E&M-EST. PATIENT-LVL IV: CPT | Mod: PBBFAC,,, | Performed by: FAMILY MEDICINE

## 2023-10-11 PROCEDURE — 99214 PR OFFICE/OUTPT VISIT, EST, LEVL IV, 30-39 MIN: ICD-10-PCS | Mod: S$GLB,,, | Performed by: FAMILY MEDICINE

## 2023-10-11 PROCEDURE — 3044F PR MOST RECENT HEMOGLOBIN A1C LEVEL <7.0%: ICD-10-PCS | Mod: CPTII,S$GLB,, | Performed by: FAMILY MEDICINE

## 2023-10-11 PROCEDURE — 3074F PR MOST RECENT SYSTOLIC BLOOD PRESSURE < 130 MM HG: ICD-10-PCS | Mod: CPTII,S$GLB,, | Performed by: FAMILY MEDICINE

## 2023-10-11 PROCEDURE — 1160F RVW MEDS BY RX/DR IN RCRD: CPT | Mod: CPTII,S$GLB,, | Performed by: FAMILY MEDICINE

## 2023-10-11 PROCEDURE — 3044F HG A1C LEVEL LT 7.0%: CPT | Mod: CPTII,S$GLB,, | Performed by: FAMILY MEDICINE

## 2023-10-11 PROCEDURE — 99999 PR PBB SHADOW E&M-EST. PATIENT-LVL IV: ICD-10-PCS | Mod: PBBFAC,,, | Performed by: FAMILY MEDICINE

## 2023-10-11 PROCEDURE — 3078F DIAST BP <80 MM HG: CPT | Mod: CPTII,S$GLB,, | Performed by: FAMILY MEDICINE

## 2023-10-11 PROCEDURE — 1159F MED LIST DOCD IN RCRD: CPT | Mod: CPTII,S$GLB,, | Performed by: FAMILY MEDICINE

## 2023-10-11 PROCEDURE — 3074F SYST BP LT 130 MM HG: CPT | Mod: CPTII,S$GLB,, | Performed by: FAMILY MEDICINE

## 2023-10-11 PROCEDURE — 1160F PR REVIEW ALL MEDS BY PRESCRIBER/CLIN PHARMACIST DOCUMENTED: ICD-10-PCS | Mod: CPTII,S$GLB,, | Performed by: FAMILY MEDICINE

## 2023-10-11 PROCEDURE — 1159F PR MEDICATION LIST DOCUMENTED IN MEDICAL RECORD: ICD-10-PCS | Mod: CPTII,S$GLB,, | Performed by: FAMILY MEDICINE

## 2023-10-11 RX ORDER — METFORMIN HYDROCHLORIDE 1000 MG/1
1000 TABLET, FILM COATED, EXTENDED RELEASE ORAL
Qty: 90 TABLET | Refills: 3 | Status: SHIPPED | OUTPATIENT
Start: 2023-10-11 | End: 2024-10-10

## 2023-10-11 NOTE — PROGRESS NOTES
Subjective     Patient ID: Betty Westbrook is a 33 y.o. female.    Chief Complaint: follow up    Pt is frustrated b/c has had weight gain. Tolerating the metformin. Metformin causes alternating constipation and diarrhea. Not an emotional eater.    Starting bmi with the study was 27.5.     Tolerating her thyroid medication.        HPI  Review of Systems   Constitutional:  Negative for activity change, appetite change, fatigue and fever.   HENT:  Negative for mouth dryness and goiter.    Eyes:  Negative for visual disturbance.   Respiratory:  Negative for apnea, cough, chest tightness and shortness of breath.    Cardiovascular:  Negative for chest pain, palpitations and leg swelling.   Gastrointestinal:  Negative for abdominal pain, constipation and diarrhea.   Endocrine: Negative for cold intolerance, heat intolerance, polydipsia, polyphagia and polyuria.   Genitourinary:  Negative for frequency and menstrual problem.   Musculoskeletal:  Negative for arthralgias and myalgias.   Integumentary:  Negative for color change and rash.   Psychiatric/Behavioral:  Negative for sleep disturbance. The patient is not nervous/anxious.           Objective     There were no vitals filed for this visit.  Wt Readings from Last 10 Encounters:   10/04/23 64.7 kg (142 lb 10.2 oz)   06/01/23 60.4 kg (133 lb 2.5 oz)   05/30/23 60.5 kg (133 lb 6.1 oz)   02/08/23 55.3 kg (122 lb)       Physical Exam  Vitals and nursing note reviewed.   Constitutional:       General: She is not in acute distress.  HENT:      Head: Normocephalic and atraumatic.      Mouth/Throat:      Pharynx: Oropharynx is clear.   Eyes:      General: No scleral icterus.     Pupils: Pupils are equal, round, and reactive to light.   Neck:      Comments: No TM  Cardiovascular:      Rate and Rhythm: Normal rate and regular rhythm.      Pulses: Normal pulses.      Heart sounds: Normal heart sounds. No murmur heard.     No friction rub. No gallop.   Pulmonary:      Effort:  Pulmonary effort is normal. No respiratory distress.      Breath sounds: Normal breath sounds. No wheezing, rhonchi or rales.   Abdominal:      General: Bowel sounds are normal. There is no distension.      Palpations: Abdomen is soft.      Tenderness: There is no abdominal tenderness.   Musculoskeletal:         General: No swelling.      Cervical back: Normal range of motion and neck supple. No tenderness.   Lymphadenopathy:      Cervical: No cervical adenopathy.   Skin:     General: Skin is warm.      Capillary Refill: Capillary refill takes less than 2 seconds.      Findings: No erythema or rash.   Neurological:      Mental Status: She is alert and oriented to person, place, and time.   Psychiatric:         Mood and Affect: Mood normal.         Behavior: Behavior normal.         PAST MEDICAL HISTORY:  Past Medical History:   Diagnosis Date    Gestational diabetes     Hypothyroidism     Insulin resistance          PAST SURGICAL HISTORY:  Past Surgical History:   Procedure Laterality Date     SECTION, LOW TRANSVERSE  2019    x 2 and also in     WISDOM TOOTH EXTRACTION  2019       FAMILY HISTORY:  Family History   Problem Relation Age of Onset    Breast cancer Neg Hx     Colon cancer Neg Hx     Ovarian cancer Neg Hx           SOCIAL HISTORY:  Social History     Social History Narrative    Not on file       MEDICATIONS:  Medications have been reviewed.    ALLERGIES:  Allergies have been reviewed.       Assessment and Plan       ICD-10-CM ICD-9-CM   1. Insulin resistance  E88.819 277.7   2. Acquired hypothyroidism  E03.9 244.9   3. History of gestational diabetes  Z86.32 V12.21   4. Family history of diabetes mellitus (DM)  Z83.3 V18.0   5. BMI 24.0-24.9, adult  Z68.24 V85.1   6. Risk of becoming overweight  Z91.89 V15.89        Insulin resistance  Comments:  on metformin  Orders:  -     metFORMIN (GLUMETZA) 1000 MG (MOD) 24hr tablet; Take 1 tablet (1,000 mg total) by mouth daily with breakfast.  Dispense:  90 tablet; Refill: 3  -     Ambulatory Referral/Consult to Nutrition Services - [facility]; Future; Expected date: 10/18/2023    Acquired hypothyroidism  Comments:  chronic/stable; continue replacment      History of gestational diabetes  -     metFORMIN (GLUMETZA) 1000 MG (MOD) 24hr tablet; Take 1 tablet (1,000 mg total) by mouth daily with breakfast.  Dispense: 90 tablet; Refill: 3  -     Ambulatory Referral/Consult to Nutrition Services - [facility]; Future; Expected date: 10/18/2023    Family history of diabetes mellitus (DM)  -     metFORMIN (GLUMETZA) 1000 MG (MOD) 24hr tablet; Take 1 tablet (1,000 mg total) by mouth daily with breakfast.  Dispense: 90 tablet; Refill: 3    BMI 24.0-24.9, adult  Comments:  starting weight bmi 27.5 prior to the study  Orders:  -     Ambulatory Referral/Consult to Nutrition Services - [facility]; Future; Expected date: 10/18/2023    Risk of becoming overweight  -     metFORMIN (GLUMETZA) 1000 MG (MOD) 24hr tablet; Take 1 tablet (1,000 mg total) by mouth daily with breakfast.  Dispense: 90 tablet; Refill: 3               See dietitian  Consider zinc/magnesium/berberine supplementation

## 2023-10-11 NOTE — PATIENT INSTRUCTIONS
Insulin Outlet  (Printed rx)    Rybelsus oral could be an option (insulin outlet)  Mounjaro? Dec    [email or web address]    See dietitian  Consider zinc/magnesium/berberine supplementation

## 2023-10-12 ENCOUNTER — PATIENT MESSAGE (OUTPATIENT)
Dept: PRIMARY CARE CLINIC | Facility: CLINIC | Age: 33
End: 2023-10-12
Payer: COMMERCIAL

## 2023-11-06 DIAGNOSIS — R11.0 NAUSEA: Primary | ICD-10-CM

## 2023-11-06 RX ORDER — ONDANSETRON 4 MG/1
4 TABLET, FILM COATED ORAL EVERY 8 HOURS PRN
Qty: 12 TABLET | Refills: 0 | Status: SHIPPED | OUTPATIENT
Start: 2023-11-06

## 2024-03-01 ENCOUNTER — TELEPHONE (OUTPATIENT)
Dept: PRIMARY CARE CLINIC | Facility: CLINIC | Age: 34
End: 2024-03-01
Payer: COMMERCIAL

## 2024-03-12 DIAGNOSIS — R11.0 NAUSEA: ICD-10-CM

## 2024-03-12 RX ORDER — ONDANSETRON 4 MG/1
4 TABLET, FILM COATED ORAL EVERY 8 HOURS PRN
Qty: 12 TABLET | Refills: 0 | OUTPATIENT
Start: 2024-03-12

## 2024-05-04 ENCOUNTER — OFFICE VISIT (OUTPATIENT)
Dept: URGENT CARE | Facility: CLINIC | Age: 34
End: 2024-05-04
Payer: COMMERCIAL

## 2024-05-04 VITALS
SYSTOLIC BLOOD PRESSURE: 111 MMHG | OXYGEN SATURATION: 99 % | HEIGHT: 64 IN | WEIGHT: 141.13 LBS | TEMPERATURE: 99 F | RESPIRATION RATE: 18 BRPM | HEART RATE: 90 BPM | DIASTOLIC BLOOD PRESSURE: 64 MMHG | BODY MASS INDEX: 24.1 KG/M2

## 2024-05-04 DIAGNOSIS — J06.9 URI WITH COUGH AND CONGESTION: Primary | ICD-10-CM

## 2024-05-04 LAB
CTP QC/QA: YES
CTP QC/QA: YES
POC MOLECULAR INFLUENZA A AGN: NEGATIVE
POC MOLECULAR INFLUENZA B AGN: NEGATIVE
SARS-COV-2 AG RESP QL IA.RAPID: NEGATIVE

## 2024-05-04 PROCEDURE — 87502 INFLUENZA DNA AMP PROBE: CPT | Mod: QW,S$GLB,, | Performed by: PHYSICIAN ASSISTANT

## 2024-05-04 PROCEDURE — 99214 OFFICE O/P EST MOD 30 MIN: CPT | Mod: S$GLB,,, | Performed by: PHYSICIAN ASSISTANT

## 2024-05-04 PROCEDURE — 87811 SARS-COV-2 COVID19 W/OPTIC: CPT | Mod: QW,S$GLB,, | Performed by: PHYSICIAN ASSISTANT

## 2024-05-04 RX ORDER — BROMPHENIRAMINE MALEATE, PSEUDOEPHEDRINE HYDROCHLORIDE, AND DEXTROMETHORPHAN HYDROBROMIDE 2; 30; 10 MG/5ML; MG/5ML; MG/5ML
10 SYRUP ORAL EVERY 4 HOURS PRN
Qty: 120 ML | Refills: 0 | Status: SHIPPED | OUTPATIENT
Start: 2024-05-04 | End: 2024-05-14

## 2024-05-04 RX ORDER — FLUTICASONE PROPIONATE 50 MCG
2 SPRAY, SUSPENSION (ML) NASAL DAILY
Qty: 9.9 ML | Refills: 0 | Status: SHIPPED | OUTPATIENT
Start: 2024-05-04 | End: 2024-05-11

## 2024-05-04 NOTE — PROGRESS NOTES
"Subjective:      Patient ID: Betty Westbrook is a 34 y.o. female.    Vitals:  height is 5' 4" (1.626 m) and weight is 64 kg (141 lb 1.5 oz). Her tympanic temperature is 99 °F (37.2 °C). Her blood pressure is 111/64 and her pulse is 90. Her respiration is 18 and oxygen saturation is 99%.     Chief Complaint: Sinus Problem    Patient presents with cough, sinus congestion, sore throat, rhinorrhea. OTC - Tylenol, Advil. On set of symptoms Friday 05/03/2024. Patient denies direct exposure. 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 No exposure at home. + fever. No CP or SOB    Sinus Problem  This is a new problem. The current episode started yesterday. The problem is unchanged. The maximum temperature recorded prior to her arrival was 101 - 101.9 F. The fever has been present for Less than 1 day. Her pain is at a severity of 0/10. She is experiencing no pain. Associated symptoms include chills, congestion, coughing, a hoarse voice, sinus pressure and sneezing. Pertinent negatives include no diaphoresis, ear pain, headaches, neck pain, shortness of breath, sore throat or swollen glands. Past treatments include acetaminophen and nasal decongestants. The treatment provided mild relief.       Constitution: Positive for chills. Negative for appetite change and sweating.   HENT:  Positive for congestion, postnasal drip and sinus pressure. Negative for ear pain and sore throat.    Neck: Negative for neck pain.   Respiratory:  Positive for cough and sputum production. Negative for shortness of breath.    Gastrointestinal:  Negative for abdominal pain, nausea and vomiting.   Musculoskeletal:  Positive for muscle ache.   Allergic/Immunologic: Positive for sneezing.   Neurological:  Negative for headaches.      Objective:     Physical Exam   Constitutional: She is oriented to person, place, and time. She appears well-developed. She is cooperative.  Non-toxic appearance. She does not appear ill. No distress.   HENT:   Head: " Normocephalic and atraumatic.   Ears:   Right Ear: Hearing, tympanic membrane, external ear and ear canal normal.   Left Ear: Hearing, tympanic membrane, external ear and ear canal normal.   Nose: Rhinorrhea and congestion present. No mucosal edema or nasal deformity. No epistaxis. Right sinus exhibits no maxillary sinus tenderness and no frontal sinus tenderness. Left sinus exhibits no maxillary sinus tenderness and no frontal sinus tenderness.   Mouth/Throat: Uvula is midline and mucous membranes are normal. No trismus in the jaw. Normal dentition. No uvula swelling. Posterior oropharyngeal erythema (mild hyperemia) and cobblestoning present. No oropharyngeal exudate or posterior oropharyngeal edema.   Eyes: Conjunctivae and lids are normal. No scleral icterus.   Neck: Trachea normal and phonation normal. Neck supple. No edema present. No erythema present. No neck rigidity present.   Cardiovascular: Normal rate, regular rhythm, normal heart sounds and normal pulses.   Pulmonary/Chest: Effort normal and breath sounds normal. No respiratory distress. She has no decreased breath sounds. She has no wheezes. She has no rhonchi.   Abdominal: Normal appearance.   Musculoskeletal: Normal range of motion.         General: No deformity. Normal range of motion.   Neurological: She is alert and oriented to person, place, and time. She exhibits normal muscle tone. Coordination normal.   Skin: Skin is warm, dry, intact, not diaphoretic and not pale.   Psychiatric: Her speech is normal and behavior is normal. Judgment and thought content normal.   Nursing note and vitals reviewed.    Results for orders placed or performed in visit on 05/04/24   POCT Influenza A/B Molecular   Result Value Ref Range    POC Molecular Influenza A Ag Negative Negative    POC Molecular Influenza B Ag Negative Negative     Acceptable Yes    SARS Coronavirus 2 Antigen, POCT Manual Read   Result Value Ref Range    SARS Coronavirus 2  Antigen Negative Negative     Acceptable Yes        Assessment:     1. URI with cough and congestion        Plan:       URI with cough and congestion  -     POCT Influenza A/B Molecular  -     SARS Coronavirus 2 Antigen, POCT Manual Read  -     brompheniramine-pseudoeph-DM (BROMFED DM) 2-30-10 mg/5 mL Syrp; Take 10 mLs by mouth every 4 (four) hours as needed (cough, congestion, runny nose).  Dispense: 120 mL; Refill: 0  -     fluticasone propionate (FLONASE) 50 mcg/actuation nasal spray; 2 sprays (100 mcg total) by Each Nostril route once daily. Dispense 1 bottle for 7 days  Dispense: 9.9 mL; Refill: 0    Caroline Fusilier PA-C [facility]       Patient Instructions   Take bromfed for congestion, cough and runny nose. (do not combine with OTC medications also containing decongestant, antihistamine or cough syrup). Sterile saline rinses and flonase to decongest. Tylenol or motrin for pain/fever. Rest and drink plenty of fluids.     Follow up with your doctor for any persistent new fever > 4 days, or persistent sinus pressure/congestion > 10 days.You need to be seen urgently if you develop any chest pain or shortness of breath.

## 2024-05-04 NOTE — PATIENT INSTRUCTIONS
Take bromfed for congestion, cough and runny nose. (do not combine with OTC medications also containing decongestant, antihistamine or cough syrup). Sterile saline rinses and flonase to decongest. Tylenol or motrin for pain/fever. Rest and drink plenty of fluids.     Follow up with your doctor for any persistent new fever > 4 days, or persistent sinus pressure/congestion > 10 days.You need to be seen urgently if you develop any chest pain or shortness of breath.

## 2024-05-08 ENCOUNTER — OFFICE VISIT (OUTPATIENT)
Dept: PRIMARY CARE CLINIC | Facility: CLINIC | Age: 34
End: 2024-05-08
Payer: COMMERCIAL

## 2024-05-08 ENCOUNTER — LAB VISIT (OUTPATIENT)
Dept: LAB | Facility: HOSPITAL | Age: 34
End: 2024-05-08
Attending: INTERNAL MEDICINE
Payer: COMMERCIAL

## 2024-05-08 ENCOUNTER — PATIENT MESSAGE (OUTPATIENT)
Dept: RESEARCH | Facility: HOSPITAL | Age: 34
End: 2024-05-08
Payer: COMMERCIAL

## 2024-05-08 VITALS
SYSTOLIC BLOOD PRESSURE: 112 MMHG | BODY MASS INDEX: 24.07 KG/M2 | OXYGEN SATURATION: 97 % | HEART RATE: 85 BPM | DIASTOLIC BLOOD PRESSURE: 64 MMHG | WEIGHT: 141 LBS | TEMPERATURE: 98 F | HEIGHT: 64 IN

## 2024-05-08 DIAGNOSIS — L85.3 XEROSIS CUTIS: ICD-10-CM

## 2024-05-08 DIAGNOSIS — Z00.00 ANNUAL PHYSICAL EXAM: ICD-10-CM

## 2024-05-08 DIAGNOSIS — E03.9 ACQUIRED HYPOTHYROIDISM: ICD-10-CM

## 2024-05-08 DIAGNOSIS — Z00.00 ANNUAL PHYSICAL EXAM: Primary | ICD-10-CM

## 2024-05-08 DIAGNOSIS — L85.8 KERATOSIS PILARIS: ICD-10-CM

## 2024-05-08 DIAGNOSIS — Z86.32 HISTORY OF GESTATIONAL DIABETES: ICD-10-CM

## 2024-05-08 DIAGNOSIS — L30.9 FACIAL ECZEMA: ICD-10-CM

## 2024-05-08 PROBLEM — R11.0 NAUSEA: Status: RESOLVED | Noted: 2022-06-01 | Resolved: 2024-05-08

## 2024-05-08 LAB
ALBUMIN SERPL BCP-MCNC: 3.8 G/DL (ref 3.5–5.2)
ALP SERPL-CCNC: 59 U/L (ref 55–135)
ALT SERPL W/O P-5'-P-CCNC: 33 U/L (ref 10–44)
ANION GAP SERPL CALC-SCNC: 13 MMOL/L (ref 8–16)
AST SERPL-CCNC: 21 U/L (ref 10–40)
BASOPHILS # BLD AUTO: 0.02 K/UL (ref 0–0.2)
BASOPHILS NFR BLD: 0.5 % (ref 0–1.9)
BILIRUB SERPL-MCNC: 0.4 MG/DL (ref 0.1–1)
BUN SERPL-MCNC: 8 MG/DL (ref 6–20)
CALCIUM SERPL-MCNC: 9.9 MG/DL (ref 8.7–10.5)
CHLORIDE SERPL-SCNC: 105 MMOL/L (ref 95–110)
CHOLEST SERPL-MCNC: 108 MG/DL (ref 120–199)
CHOLEST/HDLC SERPL: 3.4 {RATIO} (ref 2–5)
CO2 SERPL-SCNC: 23 MMOL/L (ref 23–29)
CREAT SERPL-MCNC: 0.7 MG/DL (ref 0.5–1.4)
DIFFERENTIAL METHOD BLD: ABNORMAL
EOSINOPHIL # BLD AUTO: 0 K/UL (ref 0–0.5)
EOSINOPHIL NFR BLD: 0.7 % (ref 0–8)
ERYTHROCYTE [DISTWIDTH] IN BLOOD BY AUTOMATED COUNT: 12.2 % (ref 11.5–14.5)
EST. GFR  (NO RACE VARIABLE): >60 ML/MIN/1.73 M^2
ESTIMATED AVG GLUCOSE: 97 MG/DL (ref 68–131)
GLUCOSE SERPL-MCNC: 76 MG/DL (ref 70–110)
HBA1C MFR BLD: 5 % (ref 4–5.6)
HCT VFR BLD AUTO: 37.6 % (ref 37–48.5)
HDLC SERPL-MCNC: 32 MG/DL (ref 40–75)
HDLC SERPL: 29.6 % (ref 20–50)
HGB BLD-MCNC: 12.9 G/DL (ref 12–16)
IMM GRANULOCYTES # BLD AUTO: 0.01 K/UL (ref 0–0.04)
IMM GRANULOCYTES NFR BLD AUTO: 0.2 % (ref 0–0.5)
INSULIN COLLECTION INTERVAL: NORMAL
INSULIN SERPL-ACNC: 5.6 UU/ML
LDLC SERPL CALC-MCNC: 65.2 MG/DL (ref 63–159)
LYMPHOCYTES # BLD AUTO: 1.1 K/UL (ref 1–4.8)
LYMPHOCYTES NFR BLD: 24.1 % (ref 18–48)
MCH RBC QN AUTO: 33 PG (ref 27–31)
MCHC RBC AUTO-ENTMCNC: 34.3 G/DL (ref 32–36)
MCV RBC AUTO: 96 FL (ref 82–98)
MONOCYTES # BLD AUTO: 0.3 K/UL (ref 0.3–1)
MONOCYTES NFR BLD: 5.9 % (ref 4–15)
NEUTROPHILS # BLD AUTO: 3.1 K/UL (ref 1.8–7.7)
NEUTROPHILS NFR BLD: 68.6 % (ref 38–73)
NONHDLC SERPL-MCNC: 76 MG/DL
NRBC BLD-RTO: 0 /100 WBC
PLATELET # BLD AUTO: 202 K/UL (ref 150–450)
PMV BLD AUTO: 11.9 FL (ref 9.2–12.9)
POTASSIUM SERPL-SCNC: 4.3 MMOL/L (ref 3.5–5.1)
PROT SERPL-MCNC: 7.6 G/DL (ref 6–8.4)
RBC # BLD AUTO: 3.91 M/UL (ref 4–5.4)
SODIUM SERPL-SCNC: 141 MMOL/L (ref 136–145)
T3 SERPL-MCNC: 118 NG/DL (ref 60–180)
T4 FREE SERPL-MCNC: 1.24 NG/DL (ref 0.71–1.51)
TRIGL SERPL-MCNC: 54 MG/DL (ref 30–150)
TSH SERPL DL<=0.005 MIU/L-ACNC: 1.02 UIU/ML (ref 0.4–4)
WBC # BLD AUTO: 4.44 K/UL (ref 3.9–12.7)

## 2024-05-08 PROCEDURE — 99999 PR PBB SHADOW E&M-EST. PATIENT-LVL IV: CPT | Mod: PBBFAC,,, | Performed by: INTERNAL MEDICINE

## 2024-05-08 PROCEDURE — 3078F DIAST BP <80 MM HG: CPT | Mod: CPTII,S$GLB,, | Performed by: INTERNAL MEDICINE

## 2024-05-08 PROCEDURE — 36415 COLL VENOUS BLD VENIPUNCTURE: CPT | Mod: PN | Performed by: INTERNAL MEDICINE

## 2024-05-08 PROCEDURE — 1159F MED LIST DOCD IN RCRD: CPT | Mod: CPTII,S$GLB,, | Performed by: INTERNAL MEDICINE

## 2024-05-08 PROCEDURE — 85025 COMPLETE CBC W/AUTO DIFF WBC: CPT | Performed by: INTERNAL MEDICINE

## 2024-05-08 PROCEDURE — 83036 HEMOGLOBIN GLYCOSYLATED A1C: CPT | Performed by: INTERNAL MEDICINE

## 2024-05-08 PROCEDURE — 81001 URINALYSIS AUTO W/SCOPE: CPT | Performed by: INTERNAL MEDICINE

## 2024-05-08 PROCEDURE — 3008F BODY MASS INDEX DOCD: CPT | Mod: CPTII,S$GLB,, | Performed by: INTERNAL MEDICINE

## 2024-05-08 PROCEDURE — 80061 LIPID PANEL: CPT | Performed by: INTERNAL MEDICINE

## 2024-05-08 PROCEDURE — 84443 ASSAY THYROID STIM HORMONE: CPT | Performed by: INTERNAL MEDICINE

## 2024-05-08 PROCEDURE — 83525 ASSAY OF INSULIN: CPT | Performed by: INTERNAL MEDICINE

## 2024-05-08 PROCEDURE — 84480 ASSAY TRIIODOTHYRONINE (T3): CPT | Performed by: INTERNAL MEDICINE

## 2024-05-08 PROCEDURE — 84439 ASSAY OF FREE THYROXINE: CPT | Performed by: INTERNAL MEDICINE

## 2024-05-08 PROCEDURE — 80053 COMPREHEN METABOLIC PANEL: CPT | Performed by: INTERNAL MEDICINE

## 2024-05-08 PROCEDURE — 99395 PREV VISIT EST AGE 18-39: CPT | Mod: S$GLB,,, | Performed by: INTERNAL MEDICINE

## 2024-05-08 PROCEDURE — 3074F SYST BP LT 130 MM HG: CPT | Mod: CPTII,S$GLB,, | Performed by: INTERNAL MEDICINE

## 2024-05-08 RX ORDER — CRISABOROLE 20 MG/G
OINTMENT TOPICAL
Qty: 100 G | Refills: 11 | Status: SHIPPED | OUTPATIENT
Start: 2024-05-08

## 2024-05-08 RX ORDER — METFORMIN HYDROCHLORIDE 500 MG/1
500 TABLET, EXTENDED RELEASE ORAL 2 TIMES DAILY WITH MEALS
Qty: 180 TABLET | Refills: 3 | Status: SHIPPED | OUTPATIENT
Start: 2024-05-08

## 2024-05-08 RX ORDER — PIMECROLIMUS 10 MG/G
CREAM TOPICAL 2 TIMES DAILY
Qty: 60 G | Refills: 11 | Status: SHIPPED | OUTPATIENT
Start: 2024-05-08

## 2024-05-08 RX ORDER — LEVOTHYROXINE SODIUM 50 UG/1
50 TABLET ORAL EVERY MORNING
Qty: 90 TABLET | Refills: 3 | Status: SHIPPED | OUTPATIENT
Start: 2024-05-08

## 2024-05-08 NOTE — PROGRESS NOTES
"Subjective     Patient ID: Betty Westbrook is a 34 y.o. female.    Chief Complaint: Annual Exam and Medication Refill      HPI  Here for an annual and labs.  Has derm appt upcoming.  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  She has 2 small children and is .  Had pap last year.    Past Medical History:   Diagnosis Date    Gestational diabetes     Hypothyroidism     Insulin resistance      Review of patient's allergies indicates:  No Known Allergies  Past Surgical History:   Procedure Laterality Date     SECTION, LOW TRANSVERSE  2019    x 2 and also in     WISDOM TOOTH EXTRACTION  2019     Family History   Problem Relation Name Age of Onset    Breast cancer Neg Hx      Colon cancer Neg Hx      Ovarian cancer Neg Hx       Social History     Socioeconomic History    Marital status:      Spouse name: Chencho    Number of children: 2   Occupational History    Occupation: Pediatric OT   Tobacco Use    Smoking status: Never     Passive exposure: Never    Smokeless tobacco: Never   Substance and Sexual Activity    Alcohol use: Yes     Alcohol/week: 2.0 standard drinks of alcohol     Types: 2 Standard drinks or equivalent per week    Drug use: Never    Sexual activity: Yes     Partners: Male     Birth control/protection: I.U.D.         /64 (BP Location: Right arm)   Pulse 85   Temp 98.3 °F (36.8 °C) (Tympanic)   Ht 5' 4" (1.626 m)   Wt 64 kg (140 lb 15.8 oz)   SpO2 97%   BMI 24.20 kg/m²   Outpatient Medications as of 2024   Medication Sig Dispense Refill    brompheniramine-pseudoeph-DM (BROMFED DM) 2-30-10 mg/5 mL Syrp Take 10 mLs by mouth every 4 (four) hours as needed (cough, congestion, runny nose). 120 mL 0    fluticasone propionate (FLONASE) 50 mcg/actuation nasal spray 2 sprays (100 mcg total) by Each Nostril route once daily. Dispense 1 bottle for 7 days 9.9 mL 0    levonorgestreL (MIRENA) 21 mcg/24 hours (8 yrs) 52 mg IUD 1 each by Intrauterine route.  "     levothyroxine (SYNTHROID) 50 MCG tablet Take 1 tablet by mouth every morning. 90 tablet 3    metFORMIN (GLUCOPHAGE-XR) 500 MG ER 24hr tablet Take 1 tablet (500 mg total) by mouth 2 (two) times daily with meals. For prediabetes/sugar 180 tablet 3    ondansetron (ZOFRAN) 4 MG tablet Take 1 tablet (4 mg total) by mouth every 8 (eight) hours as needed for Nausea. (Patient not taking: Reported on 5/4/2024) 12 tablet 0    ondansetron (ZOFRAN-ODT) 4 MG TbDL DISSOLVE 1 TABLET ON THE TONGUE EVERY 8 HOURS FOR 10 DAYS AS NEEDED (Patient not taking: Reported on 5/30/2023) 30 tablet 2     No current facility-administered medications on file as of 5/8/2024.       Review of Systems   All other systems reviewed and are negative.         Objective     Physical Exam  Constitutional:       General: She is not in acute distress.     Appearance: Normal appearance. She is not ill-appearing, toxic-appearing or diaphoretic.   HENT:      Head: Normocephalic and atraumatic.      Right Ear: External ear normal.      Left Ear: External ear normal.      Mouth/Throat:      Mouth: Mucous membranes are moist.      Pharynx: Oropharynx is clear. No oropharyngeal exudate.   Eyes:      Extraocular Movements: Extraocular movements intact.      Conjunctiva/sclera: Conjunctivae normal.   Neck:      Vascular: No carotid bruit.   Cardiovascular:      Rate and Rhythm: Normal rate.      Heart sounds: Normal heart sounds. No murmur heard.     No friction rub. No gallop.   Pulmonary:      Effort: Pulmonary effort is normal. No respiratory distress.      Breath sounds: Normal breath sounds. No wheezing or rales.   Abdominal:      General: Bowel sounds are normal. There is no distension.      Palpations: Abdomen is soft. There is no mass.      Tenderness: There is no abdominal tenderness. There is no guarding.   Musculoskeletal:         General: No swelling.      Cervical back: Neck supple. No tenderness.   Lymphadenopathy:      Head:      Right side of  head: No submental, submandibular, posterior auricular or occipital adenopathy.      Left side of head: No submental, submandibular, posterior auricular or occipital adenopathy.      Cervical:      Right cervical: No superficial, deep or posterior cervical adenopathy.     Left cervical: No superficial, deep or posterior cervical adenopathy.      Upper Body:      Right upper body: No supraclavicular adenopathy.      Left upper body: No supraclavicular adenopathy.   Skin:     General: Skin is warm and dry.   Neurological:      General: No focal deficit present.      Mental Status: She is alert.   Psychiatric:         Mood and Affect: Mood normal.         Behavior: Behavior normal.         Thought Content: Thought content normal.            Assessment and Plan     1. Annual physical exam  -     Lipid Panel; Future; Expected date: 05/08/2024  -     Urinalysis; Future; Expected date: 05/08/2024  -     Hemoglobin A1C; Future; Expected date: 05/08/2024  -     TSH; Future; Expected date: 05/08/2024  -     Comprehensive Metabolic Panel; Future; Expected date: 05/08/2024  -     CBC Auto Differential; Future; Expected date: 05/08/2024  -     T4, Free; Future; Expected date: 05/08/2024  -     T3; Future; Expected date: 05/08/2024  -     Insulin, Random; Future; Expected date: 05/08/2024    2. History of gestational diabetes  -     metFORMIN (GLUCOPHAGE-XR) 500 MG ER 24hr tablet; Take 1 tablet (500 mg total) by mouth 2 (two) times daily with meals. For prediabetes/sugar  Dispense: 180 tablet; Refill: 3  -     Hemoglobin A1C; Future; Expected date: 05/08/2024  -     Insulin, Random; Future; Expected date: 05/08/2024    3. Acquired hypothyroidism  -     levothyroxine (SYNTHROID) 50 MCG tablet; Take 1 tablet by mouth every morning.  Dispense: 90 tablet; Refill: 3  -     TSH; Future; Expected date: 05/08/2024  -     T4, Free; Future; Expected date: 05/08/2024  -     T3; Future; Expected date: 05/08/2024    4. Xerosis cutis    5.  Keratosis pilaris    6. Facial eczema  -     pimecrolimus (ELIDEL) 1 % cream; Apply topically 2 (two) times daily.  Dispense: 60 g; Refill: 11  -     crisaborole (EUCRISA) 2 % Oint; Use bid as needed for facial eczema  Dispense: 100 g; Refill: 11       Cerave red/white  Emollients    1 year annual     Immunization History   Administered Date(s) Administered    COVID-19, MRNA, LN-S, PF (Pfizer) (Purple Cap) 02/03/2021, 02/24/2021, 10/20/2021    Influenza - Quadrivalent - MDCK - PF 10/16/2019    Influenza - Quadrivalent - PF *Preferred* (6 months and older) 09/24/2017, 10/30/2018, 10/23/2020, 10/20/2021, 09/26/2022    MMR 02/19/2021    Tdap 11/15/2018, 01/08/2021

## 2024-05-09 ENCOUNTER — OFFICE VISIT (OUTPATIENT)
Dept: DERMATOLOGY | Facility: CLINIC | Age: 34
End: 2024-05-09
Payer: COMMERCIAL

## 2024-05-09 ENCOUNTER — PATIENT MESSAGE (OUTPATIENT)
Dept: PRIMARY CARE CLINIC | Facility: CLINIC | Age: 34
End: 2024-05-09
Payer: COMMERCIAL

## 2024-05-09 DIAGNOSIS — D18.00 HEMANGIOMA, UNSPECIFIED SITE: ICD-10-CM

## 2024-05-09 DIAGNOSIS — D22.9 JUNCTIONAL NEVUS: ICD-10-CM

## 2024-05-09 DIAGNOSIS — L21.9 SEBORRHEIC DERMATITIS: ICD-10-CM

## 2024-05-09 DIAGNOSIS — R30.0 DYSURIA: Primary | ICD-10-CM

## 2024-05-09 DIAGNOSIS — Z12.83 SKIN CANCER SCREENING: ICD-10-CM

## 2024-05-09 DIAGNOSIS — L82.1 SEBORRHEIC KERATOSIS: ICD-10-CM

## 2024-05-09 DIAGNOSIS — L81.4 SOLAR LENTIGO: ICD-10-CM

## 2024-05-09 DIAGNOSIS — L85.3 XEROSIS CUTIS: Primary | ICD-10-CM

## 2024-05-09 LAB
BACTERIA #/AREA URNS AUTO: ABNORMAL /HPF
BILIRUB UR QL STRIP: NEGATIVE
CLARITY UR REFRACT.AUTO: CLEAR
COLOR UR AUTO: YELLOW
GLUCOSE UR QL STRIP: NEGATIVE
HGB UR QL STRIP: NEGATIVE
HYALINE CASTS UR QL AUTO: 3 /LPF
KETONES UR QL STRIP: NEGATIVE
LEUKOCYTE ESTERASE UR QL STRIP: ABNORMAL
MICROSCOPIC COMMENT: ABNORMAL
NITRITE UR QL STRIP: NEGATIVE
PH UR STRIP: 7 [PH] (ref 5–8)
PROT UR QL STRIP: NEGATIVE
RBC #/AREA URNS AUTO: 2 /HPF (ref 0–4)
SP GR UR STRIP: 1.01 (ref 1–1.03)
SQUAMOUS #/AREA URNS AUTO: 2 /HPF
URN SPEC COLLECT METH UR: ABNORMAL
WBC #/AREA URNS AUTO: 2 /HPF (ref 0–5)
YEAST UR QL AUTO: ABNORMAL

## 2024-05-09 PROCEDURE — 99204 OFFICE O/P NEW MOD 45 MIN: CPT | Mod: S$GLB,,, | Performed by: STUDENT IN AN ORGANIZED HEALTH CARE EDUCATION/TRAINING PROGRAM

## 2024-05-09 PROCEDURE — 3044F HG A1C LEVEL LT 7.0%: CPT | Mod: CPTII,S$GLB,, | Performed by: STUDENT IN AN ORGANIZED HEALTH CARE EDUCATION/TRAINING PROGRAM

## 2024-05-09 PROCEDURE — 1159F MED LIST DOCD IN RCRD: CPT | Mod: CPTII,S$GLB,, | Performed by: STUDENT IN AN ORGANIZED HEALTH CARE EDUCATION/TRAINING PROGRAM

## 2024-05-09 PROCEDURE — 99999 PR PBB SHADOW E&M-EST. PATIENT-LVL III: CPT | Mod: PBBFAC,,, | Performed by: STUDENT IN AN ORGANIZED HEALTH CARE EDUCATION/TRAINING PROGRAM

## 2024-05-09 PROCEDURE — 1160F RVW MEDS BY RX/DR IN RCRD: CPT | Mod: CPTII,S$GLB,, | Performed by: STUDENT IN AN ORGANIZED HEALTH CARE EDUCATION/TRAINING PROGRAM

## 2024-05-09 PROCEDURE — G2211 COMPLEX E/M VISIT ADD ON: HCPCS | Mod: S$GLB,,, | Performed by: STUDENT IN AN ORGANIZED HEALTH CARE EDUCATION/TRAINING PROGRAM

## 2024-05-09 RX ORDER — AMMONIUM LACTATE 12 G/100G
LOTION TOPICAL
Qty: 452 G | Refills: 5 | Status: SHIPPED | OUTPATIENT
Start: 2024-05-09

## 2024-05-09 RX ORDER — FLUOCINONIDE TOPICAL SOLUTION USP, 0.05% 0.5 MG/ML
SOLUTION TOPICAL 2 TIMES DAILY
Qty: 60 ML | Refills: 5 | Status: SHIPPED | OUTPATIENT
Start: 2024-05-09

## 2024-05-09 RX ORDER — KETOCONAZOLE 20 MG/ML
SHAMPOO, SUSPENSION TOPICAL WEEKLY
Qty: 120 ML | Refills: 5 | Status: SHIPPED | OUTPATIENT
Start: 2024-05-09

## 2024-05-09 NOTE — PROGRESS NOTES
Patient Information  Name: Betty Westbrook  : 1990  MRN: [id number]     Referring Physician:  Dr. Londono   Primary Care Physician:  Dr. Londono, Anel GIBBS MD   Date of Visit: 2024      Subjective:       Betty Westbrook is a 34 y.o. female who presents for   Chief Complaint   Patient presents with    Skin Check     FBSE, no hx of skin cancer      HPI  Patient here for skin check.     Does patient have a personal hx of skin cancers? no  Does patient have family hx of melanoma?  Yes, maternal grandfather  Does patient have hx of strong sun exposure or tanning bed use in the past? Yes to tanning beds    Patient was last seen:Visit date not found     Prior notes by myself reviewed.   Clinical documentation obtained by nursing staff reviewed.    Review of Systems   Skin:  Positive for daily sunscreen use. Negative for rash.        Objective:    Physical Exam   Constitutional: She appears well-developed and well-nourished. No distress.   Neurological: She is alert and oriented to person, place, and time. She is not disoriented.   Psychiatric: She has a normal mood and affect.   Skin:   Areas Examined (abnormalities noted in diagram):   Scalp / Hair Palpated and Inspected  Head / Face Inspection Performed  Neck Inspection Performed  Chest / Axilla Inspection Performed  Abdomen Inspection Performed  Genitals / Buttocks / Groin Inspection Performed  Back Inspection Performed  RUE Inspected  LUE Inspection Performed  RLE Inspected  LLE Inspection Performed  Nails and Digits Inspection Performed                   Diagram Legend     Erythematous scaling macule/papule c/w actinic keratosis       Vascular papule c/w angioma      Pigmented verrucoid papule/plaque c/w seborrheic keratosis      Yellow umbilicated papule c/w sebaceous hyperplasia      Irregularly shaped tan macule c/w lentigo     1-2 mm smooth white papules consistent with Milia      Movable subcutaneous cyst with punctum c/w epidermal inclusion  cyst      Subcutaneous movable cyst c/w pilar cyst      Firm pink to brown papule c/w dermatofibroma      Pedunculated fleshy papule(s) c/w skin tag(s)      Evenly pigmented macule c/w junctional nevus     Mildly variegated pigmented, slightly irregular-bordered macule c/w mildly atypical nevus      Flesh colored to evenly pigmented papule c/w intradermal nevus       Pink pearly papule/plaque c/w basal cell carcinoma      Erythematous hyperkeratotic cursted plaque c/w SCC      Surgical scar with no sign of skin cancer recurrence      Open and closed comedones      Inflammatory papules and pustules      Verrucoid papule consistent consistent with wart     Erythematous eczematous patches and plaques     Dystrophic onycholytic nail with subungual debris c/w onychomycosis     Umbilicated papule    Erythematous-base heme-crusted tan verrucoid plaque consistent with inflamed seborrheic keratosis     Erythematous Silvery Scaling Plaque c/w Psoriasis     See annotation      No images are attached to the encounter or orders placed in the encounter.    [] Data reviewed  [] Independent review of test  [] Management discussed with another provider    Assessment / Plan:        Xerosis cutis  -     ammonium lactate (LAC-HYDRIN) 12 % lotion; Apply topically as needed for Dry Skin.  Dispense: 396 g; Refill: 5    Skin cancer screening  Total body skin examination performed today including at least 12 points as noted in physical examination. No lesions suspicious for malignancy noted.    Recommend daily sun protection/avoidance, use of at least SPF 30, broad spectrum sunscreen (OTC drug), skin self examinations, and routine physician surveillance to optimize early detection    Solar lentigo  This is a benign hyperpigmented sun induced lesion. Recommend daily sun protection/avoidance and use of at least SPF 30, broad spectrum sunscreen (OTC drug) will reduce the number of new lesions. Treatment of these benign lesions are considered  cosmetic.    Hemangioma, unspecified site  This is a benign vascular lesion. Reassurance given. No treatment required.     Seborrheic keratosis  These are benign inherited growths without a malignant potential. Reassurance given to patient. No treatment is necessary.     Junctional nevus  Discussed ABCDE's of nevi.  Monitor for new mole or moles that are becoming bigger, darker, irritated, or developing irregular borders. Brochure provided. Instructed patient to observe lesion(s) for changes and follow up in clinic if changes are noted. Patient to monitor skin at home for new or changing lesions.     Seborrheic dermatitis  -     ketoconazole (NIZORAL) 2 % shampoo; Apply topically once a week. Lather in for 5-10 min before rinsing  Dispense: 120 mL; Refill: 5  -     fluocinonide (LIDEX) 0.05 % external solution; Apply topically 2 (two) times daily.  Dispense: 60 mL; Refill: 5  Counseling on topical steroids:  Patient counseled that the prolonged use of topical steroids can result in the increased appearance superficial blood vessels (telangiectasias) lightening (hypopigmentation), and   thinning of the skin ( atrophy).  Patient understands to avoid using high potency steroids in skin folds, the groin or the face.  The patient verbalized understanding of proper use and possible adverse effects of topical steroids.  All patient's questions and concerns were addressed.               LOS NUMBER AND COMPLEXITY OF PROBLEMS    COMPLEXITY OF DATA RISK TOTAL TIME (m)   79122  [id number] [] 1 self-limited or minor problem [x] Minimal to none [] No treatment recommended or patient to monitor 15-29  10-19   29619  81064 Low  [] 2 or > self limited or minor problems  [] 1 stable chronic illness  [] 1 acute, uncomplicated illness or injury Limited (2)  [] Prior external notes from each unique source  [] Review result of each unique test  [] Order each unique test []  Low  OTC medications, minor skin biopsy 30-44  20-29   05073  29988  Moderate  [x]  1 or > chronic illness with progression, exacerbation or SE of treatment  [x]  2 or more stable chronic illnesses  []  1 acute illness with systemic symptoms  []  1 acute complicated injury  []  1 undiagnosed new problem with uncertain prognosis Moderate (1/3 below)  []  3 or more data items        *Now includes assessment requiring independent historian  []  Independent interpretation of a test  []  Discuss management/test with another provider Moderate  [x]  Prescription drug mgmt  []  Minor surgery with risk discussed  []  Mgmt limited by social determinates 45-59  30-39   02731  35898 High  []  1 or more chronic illness with severe exacerbation, progression or SE of treatment  []  1 acute or chronic illness/injury that poses a threat to life or bodily function Extensive (2/3 below)  []  3 or more data items        *Now includes assessment requiring independent historian.  []  Independent interpretation of a test  []  Discuss management/test with another provider High  []  Major surgery with risk discussed  []  Drug therapy requiring intensive monitoring for toxicity  []  Hospitalization  []  Decision for DNR 60-74  40-54      No follow-ups on file.    Dali Ochoa MD, FAAD  [facility]

## 2024-05-13 ENCOUNTER — PATIENT MESSAGE (OUTPATIENT)
Dept: DERMATOLOGY | Facility: CLINIC | Age: 34
End: 2024-05-13
Payer: COMMERCIAL

## 2024-05-19 ENCOUNTER — PATIENT MESSAGE (OUTPATIENT)
Dept: PRIMARY CARE CLINIC | Facility: CLINIC | Age: 34
End: 2024-05-19
Payer: COMMERCIAL

## 2024-05-19 DIAGNOSIS — R11.0 NAUSEA: Primary | ICD-10-CM

## 2024-05-27 RX ORDER — ONDANSETRON 4 MG/1
4 TABLET, ORALLY DISINTEGRATING ORAL EVERY 12 HOURS PRN
Qty: 30 TABLET | Refills: 2 | Status: SHIPPED | OUTPATIENT
Start: 2024-05-27

## 2025-04-22 ENCOUNTER — PATIENT MESSAGE (OUTPATIENT)
Dept: PRIMARY CARE CLINIC | Facility: CLINIC | Age: 35
End: 2025-04-22
Payer: COMMERCIAL

## 2025-04-22 DIAGNOSIS — E03.9 ACQUIRED HYPOTHYROIDISM: ICD-10-CM

## 2025-04-23 RX ORDER — LEVOTHYROXINE SODIUM 50 UG/1
50 TABLET ORAL EVERY MORNING
Qty: 90 TABLET | Refills: 3 | Status: SHIPPED | OUTPATIENT
Start: 2025-04-23

## 2025-04-23 NOTE — TELEPHONE ENCOUNTER
Care Due:                  Date            Visit Type   Department     Provider  --------------------------------------------------------------------------------                                             [facility] PRIMARY  Last Visit: 05-      [facility]          LUCAS Londono  Next Visit: None Scheduled  None         None Found                                                            Last  Test          Frequency    Reason                     Performed    Due Date  --------------------------------------------------------------------------------    Cr..........  12 months..  metFORMIN................  05- 05-    HBA1C.......  6 months...  metFORMIN................  05- 11-    TSH.........  12 months..  levothyroxine............  05- 05-    [facility] Embedded Care Due Messages. Reference number: [id number].   4/23/2025 9:06:45 AM CDT

## 2025-05-06 ENCOUNTER — PATIENT MESSAGE (OUTPATIENT)
Dept: PRIMARY CARE CLINIC | Facility: CLINIC | Age: 35
End: 2025-05-06
Payer: COMMERCIAL

## 2025-05-06 DIAGNOSIS — Z00.00 PREVENTATIVE HEALTH CARE: Primary | ICD-10-CM

## 2025-05-14 ENCOUNTER — LAB VISIT (OUTPATIENT)
Dept: LAB | Facility: HOSPITAL | Age: 35
End: 2025-05-14
Attending: INTERNAL MEDICINE
Payer: COMMERCIAL

## 2025-05-14 DIAGNOSIS — Z00.00 PREVENTATIVE HEALTH CARE: ICD-10-CM

## 2025-05-14 DIAGNOSIS — R30.0 DYSURIA: ICD-10-CM

## 2025-05-14 LAB
ABSOLUTE EOSINOPHIL (OHS): 0.31 K/UL
ABSOLUTE MONOCYTE (OHS): 0.34 K/UL (ref 0.3–1)
ABSOLUTE NEUTROPHIL COUNT (OHS): 2.74 K/UL (ref 1.8–7.7)
ALBUMIN SERPL BCP-MCNC: 4 G/DL (ref 3.5–5.2)
ALP SERPL-CCNC: 39 UNIT/L (ref 40–150)
ALT SERPL W/O P-5'-P-CCNC: 24 UNIT/L (ref 10–44)
ANION GAP (OHS): 9 MMOL/L (ref 8–16)
AST SERPL-CCNC: 24 UNIT/L (ref 11–45)
BACTERIA #/AREA URNS AUTO: ABNORMAL /HPF
BASOPHILS # BLD AUTO: 0.04 K/UL
BASOPHILS NFR BLD AUTO: 0.8 %
BILIRUB SERPL-MCNC: 0.4 MG/DL (ref 0.1–1)
BILIRUB UR QL STRIP.AUTO: NEGATIVE
BUN SERPL-MCNC: 11 MG/DL (ref 6–20)
CALCIUM SERPL-MCNC: 9.1 MG/DL (ref 8.7–10.5)
CHLORIDE SERPL-SCNC: 107 MMOL/L (ref 95–110)
CHOLEST SERPL-MCNC: 149 MG/DL (ref 120–199)
CHOLEST/HDLC SERPL: 3.7 {RATIO} (ref 2–5)
CLARITY UR: ABNORMAL
CO2 SERPL-SCNC: 24 MMOL/L (ref 23–29)
COLOR UR AUTO: ABNORMAL
CREAT SERPL-MCNC: 0.7 MG/DL (ref 0.5–1.4)
EAG (OHS): 94 MG/DL (ref 68–131)
ERYTHROCYTE [DISTWIDTH] IN BLOOD BY AUTOMATED COUNT: 12.5 % (ref 11.5–14.5)
GFR SERPLBLD CREATININE-BSD FMLA CKD-EPI: >60 ML/MIN/1.73/M2
GLUCOSE SERPL-MCNC: 78 MG/DL (ref 70–110)
GLUCOSE UR QL STRIP: NEGATIVE
HBA1C MFR BLD: 4.9 % (ref 4–5.6)
HCT VFR BLD AUTO: 39.1 % (ref 37–48.5)
HDLC SERPL-MCNC: 40 MG/DL (ref 40–75)
HDLC SERPL: 26.8 % (ref 20–50)
HGB BLD-MCNC: 12.6 GM/DL (ref 12–16)
HGB UR QL STRIP: NEGATIVE
IMM GRANULOCYTES # BLD AUTO: 0.01 K/UL (ref 0–0.04)
IMM GRANULOCYTES NFR BLD AUTO: 0.2 % (ref 0–0.5)
KETONES UR QL STRIP: NEGATIVE
LDLC SERPL CALC-MCNC: 96 MG/DL (ref 63–159)
LEUKOCYTE ESTERASE UR QL STRIP: ABNORMAL
LYMPHOCYTES # BLD AUTO: 1.82 K/UL (ref 1–4.8)
MCH RBC QN AUTO: 30.8 PG (ref 27–31)
MCHC RBC AUTO-ENTMCNC: 32.2 G/DL (ref 32–36)
MCV RBC AUTO: 96 FL (ref 82–98)
MICROSCOPIC COMMENT: ABNORMAL
NITRITE UR QL STRIP: NEGATIVE
NONHDLC SERPL-MCNC: 109 MG/DL
NUCLEATED RBC (/100WBC) (OHS): 0 /100 WBC
PH UR STRIP: 6 [PH]
PLATELET # BLD AUTO: 213 K/UL (ref 150–450)
PMV BLD AUTO: 11.6 FL (ref 9.2–12.9)
POTASSIUM SERPL-SCNC: 4.3 MMOL/L (ref 3.5–5.1)
PROT SERPL-MCNC: 6.8 GM/DL (ref 6–8.4)
PROT UR QL STRIP: NEGATIVE
RBC # BLD AUTO: 4.09 M/UL (ref 4–5.4)
RBC #/AREA URNS AUTO: 5 /HPF (ref 0–4)
RELATIVE EOSINOPHIL (OHS): 5.9 %
RELATIVE LYMPHOCYTE (OHS): 34.6 % (ref 18–48)
RELATIVE MONOCYTE (OHS): 6.5 % (ref 4–15)
RELATIVE NEUTROPHIL (OHS): 52 % (ref 38–73)
SODIUM SERPL-SCNC: 140 MMOL/L (ref 136–145)
SP GR UR STRIP: 1.02
SQUAMOUS #/AREA URNS AUTO: 10 /HPF
TRIGL SERPL-MCNC: 65 MG/DL (ref 30–150)
UROBILINOGEN UR STRIP-ACNC: NEGATIVE EU/DL
WBC # BLD AUTO: 5.26 K/UL (ref 3.9–12.7)
WBC #/AREA URNS AUTO: 18 /HPF (ref 0–5)

## 2025-05-14 PROCEDURE — 85025 COMPLETE CBC W/AUTO DIFF WBC: CPT

## 2025-05-14 PROCEDURE — 81001 URINALYSIS AUTO W/SCOPE: CPT

## 2025-05-14 PROCEDURE — 36415 COLL VENOUS BLD VENIPUNCTURE: CPT | Mod: PN

## 2025-05-14 PROCEDURE — 83036 HEMOGLOBIN GLYCOSYLATED A1C: CPT

## 2025-05-14 PROCEDURE — 84443 ASSAY THYROID STIM HORMONE: CPT

## 2025-05-14 PROCEDURE — 80061 LIPID PANEL: CPT

## 2025-05-14 PROCEDURE — 84460 ALANINE AMINO (ALT) (SGPT): CPT

## 2025-05-15 ENCOUNTER — RESULTS FOLLOW-UP (OUTPATIENT)
Dept: PRIMARY CARE CLINIC | Facility: CLINIC | Age: 35
End: 2025-05-15

## 2025-05-15 LAB — TSH SERPL-ACNC: 0.49 UIU/ML (ref 0.4–4)

## 2025-05-21 ENCOUNTER — OFFICE VISIT (OUTPATIENT)
Dept: PRIMARY CARE CLINIC | Facility: CLINIC | Age: 35
End: 2025-05-21
Payer: COMMERCIAL

## 2025-05-21 VITALS
DIASTOLIC BLOOD PRESSURE: 78 MMHG | WEIGHT: 143.44 LBS | SYSTOLIC BLOOD PRESSURE: 114 MMHG | HEART RATE: 67 BPM | BODY MASS INDEX: 24.49 KG/M2 | TEMPERATURE: 97 F | HEIGHT: 64 IN

## 2025-05-21 DIAGNOSIS — Z00.00 ANNUAL PHYSICAL EXAM: Primary | ICD-10-CM

## 2025-05-21 PROCEDURE — 99999 PR PBB SHADOW E&M-EST. PATIENT-LVL III: CPT | Mod: PBBFAC,,, | Performed by: INTERNAL MEDICINE

## 2025-05-21 PROCEDURE — 1159F MED LIST DOCD IN RCRD: CPT | Mod: CPTII,S$GLB,, | Performed by: INTERNAL MEDICINE

## 2025-05-21 PROCEDURE — 3074F SYST BP LT 130 MM HG: CPT | Mod: CPTII,S$GLB,, | Performed by: INTERNAL MEDICINE

## 2025-05-21 PROCEDURE — 3044F HG A1C LEVEL LT 7.0%: CPT | Mod: CPTII,S$GLB,, | Performed by: INTERNAL MEDICINE

## 2025-05-21 PROCEDURE — 99213 OFFICE O/P EST LOW 20 MIN: CPT | Mod: 25,S$GLB,, | Performed by: INTERNAL MEDICINE

## 2025-05-21 PROCEDURE — 99395 PREV VISIT EST AGE 18-39: CPT | Mod: S$GLB,,, | Performed by: INTERNAL MEDICINE

## 2025-05-21 PROCEDURE — 3008F BODY MASS INDEX DOCD: CPT | Mod: CPTII,S$GLB,, | Performed by: INTERNAL MEDICINE

## 2025-05-21 PROCEDURE — 1160F RVW MEDS BY RX/DR IN RCRD: CPT | Mod: CPTII,S$GLB,, | Performed by: INTERNAL MEDICINE

## 2025-05-21 PROCEDURE — 3078F DIAST BP <80 MM HG: CPT | Mod: CPTII,S$GLB,, | Performed by: INTERNAL MEDICINE

## 2025-05-21 NOTE — PROGRESS NOTES
Recent Results (from the past 4 weeks)   Urinalysis    Collection Time: 05/14/25  7:42 AM   Result Value Ref Range    Color, UA Orange (A) Straw, Sallie, Yellow, Light-Orange    Appearance, UA Cloudy (A) Clear    pH, UA 6.0 5.0 - 8.0    Spec Grav UA 1.025 1.005 - 1.030    Protein, UA Negative Negative    Glucose, UA Negative Negative    Ketones, UA Negative Negative    Bilirubin, UA Negative Negative    Blood, UA Negative Negative    Nitrites, UA Negative Negative    Urobilinogen, UA Negative <2.0 EU/dL    Leukocyte Esterase, UA 3+ (A) Negative   Lipid Panel    Collection Time: 05/14/25  7:42 AM   Result Value Ref Range    Cholesterol Total 149 120 - 199 mg/dL    Triglyceride 65 30 - 150 mg/dL    HDL Cholesterol 40 40 - 75 mg/dL    LDL Cholesterol 96.0 63.0 - 159.0 mg/dL    HDL/Cholesterol Ratio 26.8 20.0 - 50.0 %    Cholesterol/HDL Ratio 3.7 2.0 - 5.0    Non HDL Cholesterol 109 mg/dL   Hemoglobin A1C    Collection Time: 05/14/25  7:42 AM   Result Value Ref Range    Hemoglobin A1c 4.9 4.0 - 5.6 %    Estimated Average Glucose 94 68 - 131 mg/dL   TSH    Collection Time: 05/14/25  7:42 AM   Result Value Ref Range    TSH 0.494 0.400 - 4.000 uIU/mL   Comprehensive Metabolic Panel    Collection Time: 05/14/25  7:42 AM   Result Value Ref Range    Sodium 140 136 - 145 mmol/L    Potassium 4.3 3.5 - 5.1 mmol/L    Chloride 107 95 - 110 mmol/L    CO2 24 23 - 29 mmol/L    Glucose 78 70 - 110 mg/dL    BUN 11 6 - 20 mg/dL    Creatinine 0.7 0.5 - 1.4 mg/dL    Calcium 9.1 8.7 - 10.5 mg/dL    Protein Total 6.8 6.0 - 8.4 gm/dL    Albumin 4.0 3.5 - 5.2 g/dL    Bilirubin Total 0.4 0.1 - 1.0 mg/dL    ALP 39 (L) 40 - 150 unit/L    AST 24 11 - 45 unit/L    ALT 24 10 - 44 unit/L    Anion Gap 9 8 - 16 mmol/L    eGFR >60 >60 mL/min/1.73/m2   CBC with Differential    Collection Time: 05/14/25  7:42 AM   Result Value Ref Range    WBC 5.26 3.90 - 12.70 K/uL    RBC 4.09 4.00 - 5.40 M/uL    HGB 12.6 12.0 - 16.0 gm/dL    HCT 39.1 37.0 - 48.5 %     MCV 96 82 - 98 fL    MCH 30.8 27.0 - 31.0 pg    MCHC 32.2 32.0 - 36.0 g/dL    RDW 12.5 11.5 - 14.5 %    Platelet Count 213 150 - 450 K/uL    MPV 11.6 9.2 - 12.9 fL    Nucleated RBC 0 <=0 /100 WBC    Neut % 52.0 38 - 73 %    Lymph % 34.6 18 - 48 %    Mono % 6.5 4 - 15 %    Eos % 5.9 <=8 %    Basophil % 0.8 <=1.9 %    Imm Grans % 0.2 0.0 - 0.5 %    Neut # 2.74 1.8 - 7.7 K/uL    Lymph # 1.82 1 - 4.8 K/uL    Mono # 0.34 0.3 - 1 K/uL    Eos # 0.31 <=0.5 K/uL    Baso # 0.04 <=0.2 K/uL    Imm Grans # 0.01 0.00 - 0.04 K/uL   Urinalysis Microscopic    Collection Time: 05/14/25  7:42 AM   Result Value Ref Range    RBC, UA 5 (H) 0 - 4 /HPF    WBC, UA 18 (H) 0 - 5 /HPF    Bacteria, UA Few (A) None, Rare, Occasional /HPF    Squamous Epithelial Cells, UA 10 /HPF    Microscopic Comment     ]  Subjective     Patient ID: Betty Westbrook is a 35 y.o. female.    Chief Complaint: Follow-up and Annual Exam (Fatigue 3+ months)      History of Present Illness    CHIEF COMPLAINT:  Ms. Westbrook presents today for fatigue and sleep issues.    Annual exam.  Labs r/w pt in detail.  Sleep issues d/w pt in detail.    SLEEP AND FATIGUE:  She reports difficulty falling asleep and poor sleep quality, with her watch recording only 10 minutes of deep sleep last night. She previously used melatonin nightly but discontinued due to sleep hallucinations, described as being half-awake and seeing visual disturbances. Magnesium provided temporary relief. She expresses preference to avoid dependence on sleep aids and is not interested in sedatives. She has modified her lifestyle to avoid caffeine in the afternoons, noting improved sleep quality. She rarely consumes alcohol on weekdays, limiting to occasional glass of wine or yakov with dinner.    FAMILY HISTORY:  Her 6-year-old child and father have ADHD.    PREVENTIVE CARE:  Last pap smear was performed on October 4th, 2023.      ROS:  General: -fever, -chills, +fatigue, -weight gain, -weight loss,  "+difficulty staying asleep, +sleep disturbances, +difficulty falling asleep  Eyes: -vision changes, -redness, -discharge  ENT: -ear pain, -nasal congestion, -sore throat  Cardiovascular: -chest pain, -palpitations, -lower extremity edema  Respiratory: -cough, -shortness of breath  Gastrointestinal: -abdominal pain, -nausea, -vomiting, -diarrhea, -constipation, -blood in stool  Genitourinary: -dysuria, -hematuria, -frequency  Musculoskeletal: -joint pain, -muscle pain  Skin: -rash, -lesion  Neurological: -headache, -dizziness, -numbness, -tingling, +hallucinations, +difficulty following instructions  Psychiatric: -anxiety, -depression, -sleep difficulty, +racing thoughts          Past Medical History:   Diagnosis Date    Gestational diabetes     Hypothyroidism     Insulin resistance      Review of patient's allergies indicates:  No Known Allergies  Past Surgical History:   Procedure Laterality Date     SECTION, LOW TRANSVERSE  2019    x 2 and also in     WISDOM TOOTH EXTRACTION  2019     Family History   Problem Relation Name Age of Onset    Breast cancer Neg Hx      Colon cancer Neg Hx      Ovarian cancer Neg Hx       Social History[1]      /78 (BP Location: Right arm, Patient Position: Sitting)   Pulse 67   Temp 97.3 °F (36.3 °C) (Tympanic)   Ht 5' 4" (1.626 m)   Wt 65.1 kg (143 lb 6.6 oz)   BMI 24.62 kg/m²   Outpatient Medications as of 2025   Medication Sig Dispense Refill    ammonium lactate (LAC-HYDRIN) 12 % lotion Apply topically as needed for Dry Skin. 452 g 5    crisaborole (EUCRISA) 2 % Oint Use twice daily as needed for facial eczema 100 g 11    fluocinonide (LIDEX) 0.05 % external solution Apply topically 2 (two) times daily. 60 mL 5    ketoconazole (NIZORAL) 2 % shampoo Apply topically once a week. Lather in for 5-10 min before rinsing 120 mL 5    levonorgestreL (MIRENA) 21 mcg/24 hours (8 yrs) 52 mg IUD 1 each by Intrauterine route.      levothyroxine (SYNTHROID) 50 MCG " tablet Take 1 tablet by mouth every morning. 90 tablet 3    metFORMIN (GLUCOPHAGE-XR) 500 MG ER 24hr tablet Take 1 tablet (500 mg total) by mouth 2 (two) times daily with meals. For prediabetes/sugar 180 tablet 3    ondansetron (ZOFRAN-ODT) 4 MG TbDL Take 1 tablet (4 mg total) by mouth every 12 (twelve) hours as needed (nausea). 30 tablet 2    pimecrolimus (ELIDEL) 1 % cream Apply topically 2 (two) times daily. 60 g 11     No current facility-administered medications on file as of 5/21/2025.              Objective     Physical Exam    General: No acute distress. Well-developed. Well-nourished.  Eyes: EOMI. Sclerae anicteric.  HENT: Normocephalic. Atraumatic. Nares patent. Moist oral mucosa.  Ears: Bilateral TMs clear. Bilateral EACs clear.  Cardiovascular: Regular rate. Regular rhythm. No murmurs. No rubs. No gallops. Normal S1, S2. Carotid flow sounds normal, no bruits.  Respiratory: Normal respiratory effort. Clear to auscultation bilaterally. No rales. No rhonchi. No wheezing.  Abdomen: Soft. Non-tender. Non-distended. Normoactive bowel sounds.  Musculoskeletal: No  obvious deformity.  Extremities: No lower extremity edema.  Neurological: Alert & oriented x3. No slurred speech. Normal gait.  Psychiatric: Normal mood. Normal affect. Good insight. Good judgment.  Skin: Warm. Dry. No rash.             Assessment and Plan     1. Annual physical exam  -     TSH; Future; Expected date: 05/21/2026  -     Lipid Panel; Future; Expected date: 05/21/2026  -     Hemoglobin A1C; Future; Expected date: 05/21/2026  -     Comprehensive Metabolic Panel; Future; Expected date: 05/21/2026  -     CBC Auto Differential; Future; Expected date: 05/21/2026  -     Vitamin D; Future; Expected date: 05/21/2026       She wants to wait and given an update on how she would like to proceed with sleep issues.  Sleep hygiene and discussion in detail.  Otc sleep aids suggested.  Decrease/avoid caffeine/etoh before bed.    Pt in agreement with  plan of care.    Follow up in about 1 year (around 5/21/2026) for annual with labs a week before.    Immunization History   Administered Date(s) Administered    COVID-19, MRNA, LN-S, PF (Pfizer) (Purple Cap) 02/03/2021, 02/24/2021, 10/20/2021    Influenza - Quadrivalent - MDCK - PF 10/16/2019    Influenza - Quadrivalent - PF *Preferred* (6 months and older) 09/24/2017, 10/30/2018, 10/23/2020, 10/20/2021, 09/26/2022    MMR 02/19/2021    Tdap 11/15/2018, 01/08/2021     Pap with gyn.         This note was generated with the assistance of ambient listening technology. Verbal consent was obtained by the patient and accompanying visitor(s) for the recording of patient appointment to facilitate this note. I attest to having reviewed and edited the generated note for accuracy, though some syntax or spelling errors may persist. Please contact the author of this note for any clarification.           [1]   Social History  Socioeconomic History    Marital status:      Spouse name: Chencho    Number of children: 2   Occupational History    Occupation: Pediatric OT   Tobacco Use    Smoking status: Never     Passive exposure: Never    Smokeless tobacco: Never   Substance and Sexual Activity    Alcohol use: Yes     Alcohol/week: 2.0 standard drinks of alcohol     Types: 2 Standard drinks or equivalent per week    Drug use: Never    Sexual activity: Yes     Partners: Male     Birth control/protection: I.U.D.